# Patient Record
Sex: MALE | Race: WHITE | NOT HISPANIC OR LATINO | Employment: OTHER | URBAN - METROPOLITAN AREA
[De-identification: names, ages, dates, MRNs, and addresses within clinical notes are randomized per-mention and may not be internally consistent; named-entity substitution may affect disease eponyms.]

---

## 2017-01-20 ENCOUNTER — TELEPHONE (OUTPATIENT)
Dept: GASTROENTEROLOGY | Facility: CLINIC | Age: 82
End: 2017-01-20

## 2017-01-20 DIAGNOSIS — R19.7 DIARRHEA, UNSPECIFIED TYPE: Primary | ICD-10-CM

## 2017-01-20 NOTE — TELEPHONE ENCOUNTER
----- Message from Courtney Jones sent at 1/20/2017  2:33 PM CST -----  Contact: Patient  Kaila, patient [phone], Returning nurses Call. Call to POD, Please advise. Thanks.

## 2017-01-26 ENCOUNTER — LAB VISIT (OUTPATIENT)
Dept: LAB | Facility: HOSPITAL | Age: 82
End: 2017-01-26
Attending: INTERNAL MEDICINE
Payer: MEDICARE

## 2017-01-26 DIAGNOSIS — R19.7 DIARRHEA, UNSPECIFIED TYPE: ICD-10-CM

## 2017-01-26 LAB — WBC #/AREA STL HPF: NORMAL /[HPF]

## 2017-01-26 PROCEDURE — 87046 STOOL CULTR AEROBIC BACT EA: CPT

## 2017-01-26 PROCEDURE — 87045 FECES CULTURE AEROBIC BACT: CPT

## 2017-01-26 PROCEDURE — 87449 NOS EACH ORGANISM AG IA: CPT

## 2017-01-26 PROCEDURE — 87209 SMEAR COMPLEX STAIN: CPT

## 2017-01-26 PROCEDURE — 89055 LEUKOCYTE ASSESSMENT FECAL: CPT

## 2017-01-26 PROCEDURE — 87329 GIARDIA AG IA: CPT

## 2017-01-26 PROCEDURE — 87427 SHIGA-LIKE TOXIN AG IA: CPT

## 2017-01-27 LAB
C DIFF GDH STL QL: NEGATIVE
C DIFF TOX A+B STL QL IA: NEGATIVE
CRYPTOSP AG STL QL IA: NEGATIVE
E COLI SXT1 STL QL IA: NEGATIVE
E COLI SXT2 STL QL IA: NEGATIVE
G LAMBLIA AG STL QL IA: NEGATIVE
O+P STL TRI STN: NORMAL

## 2017-01-30 ENCOUNTER — TELEPHONE (OUTPATIENT)
Dept: GASTROENTEROLOGY | Facility: CLINIC | Age: 82
End: 2017-01-30

## 2017-01-30 LAB — BACTERIA STL CULT: NORMAL

## 2017-02-01 ENCOUNTER — TELEPHONE (OUTPATIENT)
Dept: GASTROENTEROLOGY | Facility: CLINIC | Age: 82
End: 2017-02-01

## 2017-02-01 NOTE — TELEPHONE ENCOUNTER
----- Message from Mary Murrell sent at 2/1/2017  3:04 PM CST -----  Contact: self   [phone]  Patient is calling for lab results. Thanks!

## 2017-02-01 NOTE — TELEPHONE ENCOUNTER
Patient notified and understands stool results. He states now having constipation only having bowel movement once a week instructed to try Miralax once daily

## 2017-02-07 DIAGNOSIS — I65.23 BILATERAL CAROTID ARTERY STENOSIS: Primary | ICD-10-CM

## 2017-02-07 DIAGNOSIS — I71.40 ABDOMINAL ANEURYSM WITHOUT MENTION OF RUPTURE: ICD-10-CM

## 2017-02-21 ENCOUNTER — HOSPITAL ENCOUNTER (OUTPATIENT)
Dept: RADIOLOGY | Facility: HOSPITAL | Age: 82
Discharge: HOME | End: 2017-02-21
Attending: THORACIC SURGERY (CARDIOTHORACIC VASCULAR SURGERY)
Payer: MEDICARE

## 2017-02-21 DIAGNOSIS — I65.23 BILATERAL CAROTID ARTERY STENOSIS: ICD-10-CM

## 2017-02-21 PROCEDURE — 93880 EXTRACRANIAL BILAT STUDY: CPT | Mod: 26,,, | Performed by: RADIOLOGY

## 2017-02-21 PROCEDURE — 93880 EXTRACRANIAL BILAT STUDY: CPT | Mod: TC

## 2017-07-18 DIAGNOSIS — I65.23 BILATERAL CAROTID ARTERY STENOSIS: Primary | ICD-10-CM

## 2017-08-01 ENCOUNTER — HOSPITAL ENCOUNTER (OUTPATIENT)
Dept: RADIOLOGY | Facility: HOSPITAL | Age: 82
Discharge: HOME | End: 2017-08-01
Attending: THORACIC SURGERY (CARDIOTHORACIC VASCULAR SURGERY)
Payer: MEDICARE

## 2017-08-01 DIAGNOSIS — I65.23 BILATERAL CAROTID ARTERY STENOSIS: ICD-10-CM

## 2017-08-01 PROCEDURE — 93880 EXTRACRANIAL BILAT STUDY: CPT | Mod: TC

## 2017-08-01 PROCEDURE — 93880 EXTRACRANIAL BILAT STUDY: CPT | Mod: 26,,, | Performed by: RADIOLOGY

## 2018-09-12 DIAGNOSIS — I65.23 BILATERAL CAROTID ARTERY STENOSIS: Primary | ICD-10-CM

## 2019-01-01 ENCOUNTER — TELEPHONE (OUTPATIENT)
Dept: FAMILY MEDICINE | Facility: CLINIC | Age: 84
End: 2019-01-01

## 2019-01-01 ENCOUNTER — OFFICE VISIT (OUTPATIENT)
Dept: FAMILY MEDICINE | Facility: CLINIC | Age: 84
End: 2019-01-01
Payer: MEDICARE

## 2019-01-01 VITALS
HEIGHT: 67 IN | WEIGHT: 190 LBS | OXYGEN SATURATION: 96 % | HEART RATE: 64 BPM | DIASTOLIC BLOOD PRESSURE: 75 MMHG | SYSTOLIC BLOOD PRESSURE: 110 MMHG | BODY MASS INDEX: 29.82 KG/M2

## 2019-01-01 DIAGNOSIS — F03.90 DEMENTIA WITHOUT BEHAVIORAL DISTURBANCE, UNSPECIFIED DEMENTIA TYPE: ICD-10-CM

## 2019-01-01 DIAGNOSIS — N32.81 OAB (OVERACTIVE BLADDER): ICD-10-CM

## 2019-01-01 DIAGNOSIS — H90.0 HEARING LOSS, CONDUCTIVE, BILATERAL: ICD-10-CM

## 2019-01-01 DIAGNOSIS — I10 HYPERTENSION, UNSPECIFIED TYPE: ICD-10-CM

## 2019-01-01 DIAGNOSIS — G47.00 INSOMNIA, UNSPECIFIED TYPE: ICD-10-CM

## 2019-01-01 DIAGNOSIS — F03.90 DEMENTIA WITHOUT BEHAVIORAL DISTURBANCE, UNSPECIFIED DEMENTIA TYPE: Primary | ICD-10-CM

## 2019-01-01 DIAGNOSIS — E78.5 HYPERLIPIDEMIA, UNSPECIFIED HYPERLIPIDEMIA TYPE: Chronic | ICD-10-CM

## 2019-01-01 DIAGNOSIS — F02.80 ALZHEIMER'S DEMENTIA WITHOUT BEHAVIORAL DISTURBANCE, UNSPECIFIED TIMING OF DEMENTIA ONSET: Primary | ICD-10-CM

## 2019-01-01 DIAGNOSIS — R35.0 URINARY FREQUENCY: ICD-10-CM

## 2019-01-01 DIAGNOSIS — I25.10 CORONARY ARTERY DISEASE INVOLVING NATIVE HEART WITHOUT ANGINA PECTORIS, UNSPECIFIED VESSEL OR LESION TYPE: ICD-10-CM

## 2019-01-01 DIAGNOSIS — I49.1 PAC (PREMATURE ATRIAL CONTRACTION): ICD-10-CM

## 2019-01-01 DIAGNOSIS — G30.9 ALZHEIMER'S DEMENTIA WITHOUT BEHAVIORAL DISTURBANCE, UNSPECIFIED TIMING OF DEMENTIA ONSET: Primary | ICD-10-CM

## 2019-01-01 DIAGNOSIS — Z12.5 SPECIAL SCREENING, PROSTATE CANCER: ICD-10-CM

## 2019-01-01 DIAGNOSIS — K50.10 CROHN'S DISEASE OF COLON WITHOUT COMPLICATION: ICD-10-CM

## 2019-01-01 DIAGNOSIS — R54 FRAILTY: ICD-10-CM

## 2019-01-01 DIAGNOSIS — R26.9 GAIT DISTURBANCE: ICD-10-CM

## 2019-01-01 PROCEDURE — 3074F SYST BP LT 130 MM HG: CPT | Mod: S$GLB,,, | Performed by: FAMILY MEDICINE

## 2019-01-01 PROCEDURE — 99214 PR OFFICE/OUTPT VISIT, EST, LEVL IV, 30-39 MIN: ICD-10-PCS | Mod: S$GLB,,, | Performed by: FAMILY MEDICINE

## 2019-01-01 PROCEDURE — 3078F PR MOST RECENT DIASTOLIC BLOOD PRESSURE < 80 MM HG: ICD-10-PCS | Mod: S$GLB,,, | Performed by: FAMILY MEDICINE

## 2019-01-01 PROCEDURE — 99214 OFFICE O/P EST MOD 30 MIN: CPT | Mod: S$GLB,,, | Performed by: FAMILY MEDICINE

## 2019-01-01 PROCEDURE — 1100F PR PT FALLS ASSESS DOC 2+ FALLS/FALL W/INJURY/YR: ICD-10-PCS | Mod: S$GLB,,, | Performed by: FAMILY MEDICINE

## 2019-01-01 PROCEDURE — 3074F PR MOST RECENT SYSTOLIC BLOOD PRESSURE < 130 MM HG: ICD-10-PCS | Mod: S$GLB,,, | Performed by: FAMILY MEDICINE

## 2019-01-01 PROCEDURE — 3078F DIAST BP <80 MM HG: CPT | Mod: S$GLB,,, | Performed by: FAMILY MEDICINE

## 2019-01-01 PROCEDURE — 3288F FALL RISK ASSESSMENT DOCD: CPT | Mod: S$GLB,,, | Performed by: FAMILY MEDICINE

## 2019-01-01 PROCEDURE — 1100F PTFALLS ASSESS-DOCD GE2>/YR: CPT | Mod: S$GLB,,, | Performed by: FAMILY MEDICINE

## 2019-01-01 PROCEDURE — 3288F PR FALLS RISK ASSESSMENT DOCUMENTED: ICD-10-PCS | Mod: S$GLB,,, | Performed by: FAMILY MEDICINE

## 2019-01-01 RX ORDER — MEMANTINE HYDROCHLORIDE 10 MG/1
10 TABLET ORAL 2 TIMES DAILY
Qty: 180 TABLET | Refills: 1 | Status: SHIPPED | OUTPATIENT
Start: 2019-01-01 | End: 2019-01-01 | Stop reason: SDUPTHER

## 2019-01-01 RX ORDER — SIMVASTATIN 40 MG/1
40 TABLET, FILM COATED ORAL DAILY
Qty: 90 TABLET | Refills: 1 | Status: SHIPPED | OUTPATIENT
Start: 2019-01-01 | End: 2019-01-01 | Stop reason: SDUPTHER

## 2019-01-01 RX ORDER — OXYBUTYNIN CHLORIDE 5 MG/1
5 TABLET ORAL DAILY
Qty: 90 TABLET | Refills: 1 | Status: SHIPPED | OUTPATIENT
Start: 2019-01-01 | End: 2020-01-01 | Stop reason: SDUPTHER

## 2019-01-01 RX ORDER — LISINOPRIL 5 MG/1
1 TABLET ORAL DAILY
COMMUNITY
Start: 2014-07-21 | End: 2019-01-01 | Stop reason: SDUPTHER

## 2019-01-01 RX ORDER — DM/PE/ACETAMINOPHEN/CHLORPHENR 10-5-325-2
1000 TABLET, SEQUENTIAL ORAL 2 TIMES DAILY
COMMUNITY

## 2019-01-01 RX ORDER — MEMANTINE HYDROCHLORIDE 10 MG/1
5 TABLET ORAL 2 TIMES DAILY
COMMUNITY
End: 2019-01-01 | Stop reason: SDUPTHER

## 2019-01-01 RX ORDER — LISINOPRIL 5 MG/1
5 TABLET ORAL DAILY
Qty: 90 TABLET | Refills: 1 | Status: SHIPPED | OUTPATIENT
Start: 2019-01-01 | End: 2020-01-01 | Stop reason: SDUPTHER

## 2019-01-01 RX ORDER — DONEPEZIL HYDROCHLORIDE 5 MG/1
5 TABLET, FILM COATED ORAL
COMMUNITY
Start: 2019-01-31 | End: 2019-01-01 | Stop reason: SDUPTHER

## 2019-01-01 RX ORDER — LISINOPRIL 5 MG/1
5 TABLET ORAL DAILY
Qty: 90 TABLET | Refills: 1 | Status: SHIPPED | OUTPATIENT
Start: 2019-01-01 | End: 2019-01-01 | Stop reason: SDUPTHER

## 2019-01-01 RX ORDER — OXYBUTYNIN CHLORIDE 5 MG/1
1 TABLET ORAL DAILY
COMMUNITY
End: 2019-01-01 | Stop reason: SDUPTHER

## 2019-01-01 RX ORDER — MEMANTINE HYDROCHLORIDE 10 MG/1
10 TABLET ORAL 2 TIMES DAILY
Qty: 180 TABLET | Refills: 1 | Status: SHIPPED | OUTPATIENT
Start: 2019-01-01 | End: 2020-01-01 | Stop reason: SDUPTHER

## 2019-01-01 RX ORDER — SIMVASTATIN 40 MG/1
1 TABLET, FILM COATED ORAL DAILY
COMMUNITY
End: 2019-01-01 | Stop reason: SDUPTHER

## 2019-01-01 RX ORDER — OMEGA-3-ACID ETHYL ESTERS 1 G/1
CAPSULE, LIQUID FILLED ORAL
COMMUNITY
End: 2019-01-01 | Stop reason: SDUPTHER

## 2019-01-01 RX ORDER — DONEPEZIL HYDROCHLORIDE 5 MG/1
5 TABLET, FILM COATED ORAL DAILY
Qty: 90 TABLET | Refills: 1 | Status: SHIPPED | OUTPATIENT
Start: 2019-01-01 | End: 2020-01-01 | Stop reason: SDUPTHER

## 2019-01-01 RX ORDER — SIMVASTATIN 40 MG/1
40 TABLET, FILM COATED ORAL DAILY
Qty: 90 TABLET | Refills: 1 | Status: SHIPPED | OUTPATIENT
Start: 2019-01-01 | End: 2020-01-01 | Stop reason: SDUPTHER

## 2019-01-01 RX ORDER — OMEGA-3-ACID ETHYL ESTERS 1 G/1
1000 CAPSULE, LIQUID FILLED ORAL 2 TIMES DAILY
Qty: 180 CAPSULE | Refills: 1 | Status: SHIPPED | OUTPATIENT
Start: 2019-01-01 | End: 2020-01-01

## 2019-09-06 PROBLEM — F02.80 ALZHEIMER'S DEMENTIA WITHOUT BEHAVIORAL DISTURBANCE: Status: ACTIVE | Noted: 2019-01-01

## 2019-09-06 PROBLEM — G47.00 INSOMNIA: Status: ACTIVE | Noted: 2019-01-01

## 2019-09-06 PROBLEM — N32.81 OAB (OVERACTIVE BLADDER): Status: ACTIVE | Noted: 2019-01-01

## 2019-09-06 PROBLEM — G30.9 ALZHEIMER'S DEMENTIA WITHOUT BEHAVIORAL DISTURBANCE: Status: ACTIVE | Noted: 2019-01-01

## 2019-09-06 PROBLEM — R26.9 GAIT DISTURBANCE: Status: ACTIVE | Noted: 2019-01-01

## 2019-09-06 NOTE — PATIENT INSTRUCTIONS
Aspirin, ASA chewable tablets  What is this medicine?  ASPIRIN (AS pir in) is a pain reliever. It is used to treat mild pain and fever. This medicine is also used as directed by a doctor to prevent and to treat heart attacks, to prevent strokes, and to treat arthritis or inflammation.  How should I use this medicine?  Take this medicine by mouth. Chew it completely before swallowing. Follow the directions on the package or prescription label. Do not take your medicine more often than directed.  Talk to your pediatrician regarding the use of this medicine in children. While this drug may be prescribed for children as young as 12 years of age for selected conditions, precautions do apply. Children and teenagers should not use this medicine to treat chicken pox or flu symptoms unless directed by a doctor.  Patients over 65 years old may have a stronger reaction and need a smaller dose.  What side effects may I notice from receiving this medicine?  Side effects that you should report to your doctor or health care professional as soon as possible:  · allergic reactions like skin rash, itching or hives, swelling of the face, lips, or tongue  · breathing problems  · changes in hearing, ringing in the ears  · confusion  · general ill feeling or flu-like symptoms  · pain on swallowing  · redness, blistering, peeling or loosening of the skin, including inside the mouth or nose  · signs and symptoms of bleeding such as bloody or black, tarry stools; red or dark-brown urine; spitting up blood or brown material that looks like coffee grounds; red spots on the skin; unusual bruising or bleeding from the eye, gums, or nose  · trouble passing urine or change in the amount of urine  · unusually weak or tired  · yellowing of the eyes or skin  Side effects that usually do not require medical attention (report to your doctor or health care professional if they continue or are bothersome):  · diarrhea or constipation  · nausea,  vomiting  · stomach gas, heartburn  What may interact with this medicine?  Do not take this medicine with any of the following medications:  · cidofovir  · ketorolac  · probenecid  This medicine may also interact with the following medications:  · alcohol  · alendronate  · bismuth subsalicylate  · flavocoxid  · herbal supplements like feverfew, garlic, rossana, ginkgo biloba, horse chestnut  · medicines for diabetes or glaucoma like acetazolamide, methazolamide  · medicines for gout  · medicines that treat or prevent blood clots like enoxaparin, heparin, ticlopidine, warfarin  · other aspirin and aspirin-like medicines  · NSAIDs, medicines for pain and inflammation, like ibuprofen or naproxen  · pemetrexed  · sulfinpyrazone  · varicella live vaccine  What if I miss a dose?  If you are taking this medicine on a regular schedule and miss a dose, take it as soon as you can. If it is almost time for your next dose, take only that dose. Do not take double or extra doses.  Where should I keep my medicine?  Keep out of the reach of children.  Store at room temperature between 15 and 30 degrees C (59 and 86 degrees F). Protect from heat and moisture. Do not use this medicine if it has a strong vinegar smell. Throw away any unused medicine after the expiration date.  What should I tell my health care provider before I take this medicine?  They need to know if you have any of these conditions:  · anemia  · asthma  · bleeding problems  · child with chickenpox, the flu, or other viral infection  · diabetes  · gout  · if you frequently drink alcohol containing drinks  · kidney disease  · liver disease  · low level of vitamin K  · lupus  · smoke tobacco  · stomach ulcers or other problems  · an unusual or allergic reaction to aspirin, tartrazine dye, other medicines, dyes, or preservatives  · pregnant or trying to get pregnant  · breast-feeding  What should I watch for while using this medicine?  If you are treating yourself for  pain, tell your doctor or health care professional if the pain lasts more than 10 days, if it gets worse, or if there is a new or different kind of pain. Tell your doctor if you see redness or swelling. Also, check with your doctor if you have a fever that lasts for more than 3 days. Only take this medicine to prevent heart attacks or blood clotting if prescribed by your doctor or health care professional.  Do not take aspirin or aspirin-like medicines with this medicine. Too much aspirin can be dangerous. Always read the labels carefully.  This medicine can irritate your stomach or cause bleeding problems. Do not smoke cigarettes or drink alcohol while taking this medicine. Do not lie down for 30 minutes after taking this medicine to prevent irritation to your throat.  If you are scheduled for any medical or dental procedure, tell your healthcare provider that you are taking this medicine. You may need to stop taking this medicine before the procedure.  NOTE:This sheet is a summary. It may not cover all possible information. If you have questions about this medicine, talk to your doctor, pharmacist, or health care provider. Copyright© 2017 [employer]

## 2019-09-06 NOTE — PROGRESS NOTES
Has had  No more  Hallucinations after  Stopping the donepezil..has close  Supervision of his family..Walks in the house independently,uses a rollator to walk outdoors to get mail.Showers  Qod ,    SUBJECTIVE:    Patient ID: Kaila Bear is a 83 y.o. male.    Chief Complaint: Annual Exam    HPI    No visits with results within 6 Month(s) from this visit.   Latest known visit with results is:   Lab Visit on 01/26/2017   Component Date Value Ref Range Status    C. diff Antigen 01/26/2017 Negative   Final    C difficile Toxins A+B, EIA 01/26/2017 Negative  Negative Final    Stool Culture 01/26/2017 No Salmonella,Shigella,Vibrio,Campylobacter,Yersinia isolated.   Final    Stool Exam-Ova,Cysts,Parasites 01/26/2017 No ova or parasites seen   Final    Stool WBC 01/26/2017 No neutrophils seen  No neutrophils seen Final    Giardia Antigen - EIA 01/26/2017 Negative  Negative Final    Cryptosporidium Antigen 01/26/2017 Negative  Negative Final    Shiga Toxin 1 E.coli 01/26/2017 Negative   Final    Shiga Toxin 2 E.coli 01/26/2017 Negative   Final       Past Medical History:   Diagnosis Date    Allergy     Anticoagulant long-term use     Arthritis     Cancer     prostate    Cataract     Coronary artery disease     Hepatitis     Hyperlipidemia     Hypertension      Past Surgical History:   Procedure Laterality Date    BREAST SURGERY  2000    partial masectomy left breast    COLONOSCOPY N/A 4/30/2014    [facility]    COLONOSCOPY W/ POLYPECTOMY  oct 2011    corotid artery      ESOPHAGOGASTRODUODENOSCOPY (EGD) N/A 8/17/2015    [facility]    EYE SURGERY      slaegle/ bilateral    PROSTATE SURGERY  2000    radical      Family History   Problem Relation Age of Onset    Alzheimer's disease Mother     Kidney disease Mother     Heart disease Father     Parkinsonism Brother        Marital Status:   Alcohol History:  reports that  he does not drink alcohol.  Tobacco History:  reports that he quit smoking about 38 years ago. He has never used smokeless tobacco.  Drug History:  reports that he does not use drugs.    Review of patient's allergies indicates:   Allergen Reactions    Bactrim [sulfamethoxazole-trimethoprim]     Morphine      Other reaction(s): Hallucinations    Sulfa (sulfonamide antibiotics)      Other reaction(s): anxient  Other reaction(s): anxient    Trazodone hcl        Current Outpatient Medications:     glucosamine sulfate 2KCl 1,000 mg Tab, Take by mouth., Disp: , Rfl:     lisinopril (PRINIVIL,ZESTRIL) 5 MG tablet, Take 1 tablet by mouth once daily., Disp: , Rfl:     memantine (NAMENDA) 10 MG Tab, Take 5 mg by mouth 2 (two) times daily., Disp: , Rfl:     oxybutynin (DITROPAN) 5 MG Tab, Take 1 tablet by mouth once daily., Disp: , Rfl:     simvastatin (ZOCOR) 40 MG tablet, Take 1 tablet by mouth once daily., Disp: , Rfl:     aspirin 81 mg Tab, Every day, Disp: , Rfl:     B2/vits A,C,E/lut/zeaxanth/min (ICAPS ORAL), Take by mouth., Disp: , Rfl:     cyanocobalamin, vitamin B-12, 5,000 mcg Subl, Place 5,000 mcg under the tongue once daily., Disp: 90 tablet, Rfl: 3    desoximetasone (TOPICORT) 0.05 % cream, , Disp: , Rfl:     donepezil (ARICEPT) 5 MG tablet, Take 5 mg by mouth., Disp: , Rfl:     melatonin 10 mg Tab, Take 10 mg by mouth every evening., Disp: 50 tablet, Rfl: 3    omega-3 acid ethyl esters (LOVAZA) 1 gram capsule, Take by mouth., Disp: , Rfl:     Review of Systems   Constitutional: Negative for activity change (awake at 7 am.naps in afternoon,watches TV,reads newspaper daily,does laundry & dishes), appetite change, chills, fatigue, fever and unexpected weight change.   HENT: Positive for hearing loss (no longer  wears  hearing aids, very [place]). Negative for congestion, ear pain and trouble swallowing.    Eyes: Negative for pain, discharge and visual disturbance.   Respiratory: Negative for apnea,  "cough, shortness of breath and wheezing.    Cardiovascular: Negative for chest pain and leg swelling.   Gastrointestinal: Negative for abdominal pain, blood in stool, constipation, diarrhea, nausea and vomiting.   Endocrine: Negative for cold intolerance, heat intolerance and polydipsia.   Genitourinary: Negative for dysuria, hematuria, testicular pain and urgency.   Musculoskeletal: Negative for gait problem, joint swelling and myalgias.   Neurological: Negative for dizziness, seizures and numbness.   Psychiatric/Behavioral: Negative for agitation, behavioral problems and hallucinations. The patient is not nervous/anxious.           Objective:      Vitals:    09/06/19 0759 09/06/19 0843   BP: (!) 158/66 110/75   Pulse: 64    SpO2:  96%   Weight: 86.2 kg (190 lb)    Height: 5' 6.5" (1.689 m)      Physical Exam   Constitutional: He is oriented to person, place, and time. He appears well-developed and well-nourished.   HENT:   Head: Normocephalic and atraumatic.   Right Ear: External ear normal.   Left Ear: External ear normal.   Nose: Nose normal.   Mouth/Throat: Oropharynx is clear and moist.   Very hard of hearing.  Does not wear hearing aids   Eyes: Pupils are equal, round, and reactive to light. EOM are normal.   Neck: Normal range of motion. Neck supple. Carotid bruit is not present. No thyromegaly present.   Cardiovascular: Normal rate, regular rhythm and intact distal pulses.   Murmur (Grade 2/6 systolic murmur) heard.  Pulmonary/Chest: Effort normal and breath sounds normal. He has no wheezes. He has no rales.   Abdominal: Soft. Bowel sounds are normal. He exhibits no distension. There is no hepatosplenomegaly. There is no tenderness.   Musculoskeletal: Normal range of motion. He exhibits no edema, tenderness or deformity.        Lumbar back: Normal. He exhibits no pain and no spasm.   Bends 90 degrees at  waist   Lymphadenopathy:     He has no cervical adenopathy.   Neurological: He is alert and oriented to " person, place, and time. No cranial nerve deficit. Coordination normal.   Gait -slow and cautious   Skin: Skin is warm and dry. No rash noted.   Psychiatric: He has a normal mood and affect. His behavior is normal.   He seems content.  Does not want to socialize much at the senior center.  No behavioral outbursts recently.   Nursing note and vitals reviewed.        Assessment:       1. Alzheimer's dementia without behavioral disturbance, unspecified timing of dementia onset    2. Crohn's disease of colon without complication    3. Insomnia, unspecified type    4. PAC (premature atrial contraction)    5. OAB (overactive bladder)    6. Dementia without behavioral disturbance, unspecified dementia type    7. Gait disturbance    8. Frailty    9. Coronary artery disease involving native heart without angina pectoris, unspecified vessel or lesion type    10. Hearing loss, conductive, bilateral    11. Hypertension, unspecified type         Plan:       Alzheimer's dementia without behavioral disturbance, unspecified timing of dementia onset    Doing very well with family's care.  No dangerous behaviors or activities noted  Crohn's disease of colon without complication     Asymptomatic at this time.  Insomnia, unspecified type     Sleep patterns have normalized  PAC (premature atrial contraction)    OAB (overactive bladder)     Managing well with oxybutynin  Dementia without behavioral disturbance, unspecified dementia type      Alzheimer's seems stable with current medications  Gait disturbance     No falls recently  Frailty    Coronary artery disease involving native heart without angina pectoris, unspecified vessel or lesion type  -     Lipid panel; Future; Expected date: 09/06/2019     I recommend restarting  aspirin 81 mg daily  Hearing loss, conductive, bilateral     He is not able to manage using hearing aids anymore.  Hypertension, unspecified type  -     Urinalysis, Reflex to Urine Culture Urine, Clean Catch  -      TSH w/reflex to FT4; Future; Expected date: 03/06/2020  -     CBC auto differential; Future; Expected date: 09/06/2019  -     Comprehensive metabolic panel; Future; Expected date: 09/06/2019    Hypertension seems to be well controlled with medications  Follow up in about 6 months (around 3/6/2020) for Annual Physical.

## 2019-09-16 NOTE — TELEPHONE ENCOUNTER
----- Message from Maryjane Munoz sent at 9/16/2019  3:18 PM CDT -----  Contact: Joseph rodriguez son  The patient was seen last week . [facility].Please call his son.  Joseph # [phone] GH

## 2019-10-08 NOTE — TELEPHONE ENCOUNTER
Spoke to [employer], son, that lab is due, fasting. This is from Overdue orders alert. Pushed lab date out.

## 2019-10-08 NOTE — TELEPHONE ENCOUNTER
In looking further into this, patient had labs drawn in May and is not due right now. He is due before March office visit. I spoke to Don that he is not due for labs now, but prior to March appointment.

## 2019-12-23 NOTE — TELEPHONE ENCOUNTER
----- Message from Maryjane Munoz sent at 12/23/2019  1:07 PM CST -----  Contact: LMOR   I called the patient back to see what refill he needed . I spoke to his son.His son said he could not get him here today.  Refill lisinopril 5 mg, oxybutynin 5 mg, simvastatin 40 mg, and memantine 10 mg. [facility]. Don pts son # [phone] GH

## 2020-01-01 ENCOUNTER — HOSPITAL ENCOUNTER (INPATIENT)
Facility: HOSPITAL | Age: 85
LOS: 2 days | DRG: 177 | End: 2020-04-26
Attending: EMERGENCY MEDICINE | Admitting: INTERNAL MEDICINE
Payer: MEDICARE

## 2020-01-01 ENCOUNTER — TELEPHONE (OUTPATIENT)
Dept: FAMILY MEDICINE | Facility: CLINIC | Age: 85
End: 2020-01-01

## 2020-01-01 ENCOUNTER — OFFICE VISIT (OUTPATIENT)
Dept: FAMILY MEDICINE | Facility: CLINIC | Age: 85
End: 2020-01-01
Payer: MEDICARE

## 2020-01-01 ENCOUNTER — EXTERNAL HOME HEALTH (OUTPATIENT)
Dept: HOME HEALTH SERVICES | Facility: HOSPITAL | Age: 85
End: 2020-01-01

## 2020-01-01 ENCOUNTER — PATIENT MESSAGE (OUTPATIENT)
Dept: FAMILY MEDICINE | Facility: CLINIC | Age: 85
End: 2020-01-01

## 2020-01-01 ENCOUNTER — DOCUMENT SCAN (OUTPATIENT)
Dept: HOME HEALTH SERVICES | Facility: HOSPITAL | Age: 85
End: 2020-01-01

## 2020-01-01 VITALS
HEIGHT: 67 IN | WEIGHT: 183 LBS | DIASTOLIC BLOOD PRESSURE: 70 MMHG | SYSTOLIC BLOOD PRESSURE: 124 MMHG | BODY MASS INDEX: 28.72 KG/M2 | HEART RATE: 76 BPM

## 2020-01-01 VITALS
DIASTOLIC BLOOD PRESSURE: 60 MMHG | HEART RATE: 81 BPM | SYSTOLIC BLOOD PRESSURE: 142 MMHG | HEIGHT: 69 IN | WEIGHT: 180 LBS | OXYGEN SATURATION: 95 % | BODY MASS INDEX: 26.66 KG/M2 | RESPIRATION RATE: 18 BRPM | TEMPERATURE: 98 F

## 2020-01-01 VITALS
WEIGHT: 182 LBS | DIASTOLIC BLOOD PRESSURE: 60 MMHG | HEIGHT: 67 IN | BODY MASS INDEX: 28.56 KG/M2 | SYSTOLIC BLOOD PRESSURE: 132 MMHG | SYSTOLIC BLOOD PRESSURE: 128 MMHG | HEART RATE: 60 BPM | WEIGHT: 188 LBS | HEIGHT: 67 IN | BODY MASS INDEX: 29.51 KG/M2 | DIASTOLIC BLOOD PRESSURE: 60 MMHG | HEART RATE: 72 BPM

## 2020-01-01 DIAGNOSIS — F02.80 ALZHEIMER'S DEMENTIA WITHOUT BEHAVIORAL DISTURBANCE, UNSPECIFIED TIMING OF DEMENTIA ONSET: ICD-10-CM

## 2020-01-01 DIAGNOSIS — C61 PROSTATE CANCER: ICD-10-CM

## 2020-01-01 DIAGNOSIS — L72.3 INFECTED SEBACEOUS CYST: Primary | ICD-10-CM

## 2020-01-01 DIAGNOSIS — R26.9 GAIT DISTURBANCE: ICD-10-CM

## 2020-01-01 DIAGNOSIS — R06.00 DYSPNEA: ICD-10-CM

## 2020-01-01 DIAGNOSIS — F02.80 LATE ONSET ALZHEIMER'S DISEASE WITHOUT BEHAVIORAL DISTURBANCE: ICD-10-CM

## 2020-01-01 DIAGNOSIS — N32.81 OAB (OVERACTIVE BLADDER): ICD-10-CM

## 2020-01-01 DIAGNOSIS — K50.10 CROHN'S DISEASE OF COLON WITHOUT COMPLICATION: ICD-10-CM

## 2020-01-01 DIAGNOSIS — R53.82 CHRONIC FATIGUE: ICD-10-CM

## 2020-01-01 DIAGNOSIS — G30.9 ALZHEIMER'S DEMENTIA WITHOUT BEHAVIORAL DISTURBANCE, UNSPECIFIED TIMING OF DEMENTIA ONSET: ICD-10-CM

## 2020-01-01 DIAGNOSIS — F32.9 REACTIVE DEPRESSION: ICD-10-CM

## 2020-01-01 DIAGNOSIS — I25.10 CORONARY ARTERY DISEASE INVOLVING NATIVE CORONARY ARTERY WITHOUT ANGINA PECTORIS, UNSPECIFIED WHETHER NATIVE OR TRANSPLANTED HEART: ICD-10-CM

## 2020-01-01 DIAGNOSIS — R35.0 URINARY FREQUENCY: ICD-10-CM

## 2020-01-01 DIAGNOSIS — I10 HYPERTENSION, UNSPECIFIED TYPE: ICD-10-CM

## 2020-01-01 DIAGNOSIS — E78.5 HYPERLIPIDEMIA, UNSPECIFIED HYPERLIPIDEMIA TYPE: Chronic | ICD-10-CM

## 2020-01-01 DIAGNOSIS — M15.9 PRIMARY OSTEOARTHRITIS INVOLVING MULTIPLE JOINTS: ICD-10-CM

## 2020-01-01 DIAGNOSIS — J12.82 PNEUMONIA DUE TO COVID-19 VIRUS: ICD-10-CM

## 2020-01-01 DIAGNOSIS — N19 UREMIA: ICD-10-CM

## 2020-01-01 DIAGNOSIS — E78.2 MIXED HYPERLIPIDEMIA: Chronic | ICD-10-CM

## 2020-01-01 DIAGNOSIS — J18.9 PNEUMONIA OF BOTH LUNGS DUE TO INFECTIOUS ORGANISM, UNSPECIFIED PART OF LUNG: Primary | ICD-10-CM

## 2020-01-01 DIAGNOSIS — L08.9 INFECTED SEBACEOUS CYST: Primary | ICD-10-CM

## 2020-01-01 DIAGNOSIS — K21.9 GASTROESOPHAGEAL REFLUX DISEASE WITHOUT ESOPHAGITIS: ICD-10-CM

## 2020-01-01 DIAGNOSIS — F02.80 LATE ONSET ALZHEIMER'S DISEASE WITHOUT BEHAVIORAL DISTURBANCE: Primary | ICD-10-CM

## 2020-01-01 DIAGNOSIS — G30.1 LATE ONSET ALZHEIMER'S DISEASE WITHOUT BEHAVIORAL DISTURBANCE: Primary | ICD-10-CM

## 2020-01-01 DIAGNOSIS — F03.90 DEMENTIA WITHOUT BEHAVIORAL DISTURBANCE, UNSPECIFIED DEMENTIA TYPE: ICD-10-CM

## 2020-01-01 DIAGNOSIS — Z98.890 S/P CAROTID ENDARTERECTOMY: ICD-10-CM

## 2020-01-01 DIAGNOSIS — R41.82 ALTERED MENTAL STATUS: ICD-10-CM

## 2020-01-01 DIAGNOSIS — G30.1 LATE ONSET ALZHEIMER'S DISEASE WITHOUT BEHAVIORAL DISTURBANCE: ICD-10-CM
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
## 2020-01-01 DIAGNOSIS — H91.93 HEARING DIFFICULTY OF BOTH EARS: ICD-10-CM

## 2020-01-01 DIAGNOSIS — U07.1 PNEUMONIA DUE TO COVID-19 VIRUS: ICD-10-CM

## 2020-01-01 DIAGNOSIS — R00.1 BRADYCARDIA: ICD-10-CM

## 2020-01-01 DIAGNOSIS — Z11.1 SCREENING-PULMONARY TB: ICD-10-CM

## 2020-01-01 DIAGNOSIS — N17.9 ACUTE KIDNEY INJURY: ICD-10-CM

## 2020-01-01 LAB
ALBUMIN SERPL BCP-MCNC: 3.4 G/DL (ref 3.5–5.2)
ALBUMIN SERPL-MCNC: 4.1 G/DL (ref 3.6–5.1)
ALBUMIN/GLOB SERPL: 1.9 (CALC) (ref 1–2.5)
ALLENS TEST: ABNORMAL
ALP SERPL-CCNC: 60 U/L (ref 35–144)
ALP SERPL-CCNC: 93 U/L (ref 55–135)
ALT SERPL W/O P-5'-P-CCNC: 18 U/L (ref 10–44)
ALT SERPL-CCNC: 9 U/L (ref 9–46)
ANION GAP SERPL CALC-SCNC: 14 MMOL/L (ref 8–16)
APTT PPP: 27.8 SEC (ref 23.6–33.3)
AST SERPL-CCNC: 13 U/L (ref 10–35)
AST SERPL-CCNC: 36 U/L (ref 10–40)
BASOPHILS # BLD AUTO: 0.03 K/UL (ref 0–0.2)
BASOPHILS # BLD AUTO: 39 CELLS/UL (ref 0–200)
BASOPHILS NFR BLD AUTO: 0.5 %
BASOPHILS NFR BLD: 0.2 % (ref 0–1.9)
BILIRUB SERPL-MCNC: 0.6 MG/DL (ref 0.2–1.2)
BILIRUB SERPL-MCNC: 1 MG/DL (ref 0.1–1)
BNP SERPL-MCNC: 1203 PG/ML (ref 0–99)
BUN SERPL-MCNC: 146 MG/DL (ref 8–23)
BUN SERPL-MCNC: 17 MG/DL (ref 7–25)
BUN/CREAT SERPL: 14 (CALC) (ref 6–22)
CALCIUM SERPL-MCNC: 9.3 MG/DL (ref 8.7–10.5)
CALCIUM SERPL-MCNC: 9.4 MG/DL (ref 8.6–10.3)
CHLORIDE SERPL-SCNC: 104 MMOL/L (ref 98–110)
CHLORIDE SERPL-SCNC: 118 MMOL/L (ref 95–110)
CHOLEST SERPL-MCNC: 150 MG/DL
CHOLEST/HDLC SERPL: 4.1 (CALC)
CK MB SERPL-MCNC: 6.3 NG/ML (ref 0.1–6.5)
CK SERPL-CCNC: 431 U/L (ref 20–200)
CO2 SERPL-SCNC: 22 MMOL/L (ref 23–29)
CO2 SERPL-SCNC: 27 MMOL/L (ref 20–32)
CREAT SERPL-MCNC: 1.2 MG/DL (ref 0.7–1.11)
CREAT SERPL-MCNC: 4.3 MG/DL (ref 0.5–1.4)
CRP SERPL-MCNC: 13.03 MG/DL (ref 0–0.75)
DELSYS: ABNORMAL
DIFFERENTIAL METHOD: ABNORMAL
EOSINOPHIL # BLD AUTO: 0.1 K/UL (ref 0–0.5)
EOSINOPHIL # BLD AUTO: 123 CELLS/UL (ref 15–500)
EOSINOPHIL NFR BLD AUTO: 1.6 %
EOSINOPHIL NFR BLD: 1 % (ref 0–8)
EP: 5
ERYTHROCYTE [DISTWIDTH] IN BLOOD BY AUTOMATED COUNT: 13.2 % (ref 11–15)
ERYTHROCYTE [DISTWIDTH] IN BLOOD BY AUTOMATED COUNT: 14.6 % (ref 11.5–14.5)
ERYTHROCYTE [SEDIMENTATION RATE] IN BLOOD BY WESTERGREN METHOD: 12 MM/H
ERYTHROCYTE [SEDIMENTATION RATE] IN BLOOD BY WESTERGREN METHOD: 15 MM/HR (ref 0–10)
EST. GFR  (AFRICAN AMERICAN): 13.7 ML/MIN/1.73 M^2
EST. GFR  (NON AFRICAN AMERICAN): 11.9 ML/MIN/1.73 M^2
FERRITIN SERPL-MCNC: 1785 NG/ML (ref 20–300)
FIO2: 50
GFRSERPLBLD MDRD-ARVRAT: 56 ML/MIN/1.73M2
GLOBULIN SER CALC-MCNC: 2.2 G/DL (CALC) (ref 1.9–3.7)
GLUCOSE SERPL-MCNC: 106 MG/DL (ref 70–110)
GLUCOSE SERPL-MCNC: 134 MG/DL (ref 70–110)
GLUCOSE SERPL-MCNC: 96 MG/DL (ref 65–139)
HCO3 UR-SCNC: 25.6 MMOL/L (ref 24–28)
HCT VFR BLD AUTO: 42.7 % (ref 38.5–50)
HCT VFR BLD AUTO: 52.1 % (ref 40–54)
HDLC SERPL-MCNC: 37 MG/DL
HGB BLD-MCNC: 14.4 G/DL (ref 13.2–17.1)
HGB BLD-MCNC: 16.1 G/DL (ref 14–18)
IMM GRANULOCYTES # BLD AUTO: 0.07 K/UL (ref 0–0.04)
IMM GRANULOCYTES NFR BLD AUTO: 0.5 % (ref 0–0.5)
INFLUENZA A, MOLECULAR: NEGATIVE
INFLUENZA B, MOLECULAR: NEGATIVE
INR PPP: 1.5
IP: 10
LACTATE SERPL-SCNC: 2.5 MMOL/L (ref 0.5–1.9)
LDLC SERPL CALC-MCNC: 92 MG/DL (CALC)
LYMPHOCYTES # BLD AUTO: 2 K/UL (ref 1–4.8)
LYMPHOCYTES # BLD AUTO: 2456 CELLS/UL (ref 850–3900)
LYMPHOCYTES NFR BLD AUTO: 31.9 %
LYMPHOCYTES NFR BLD: 14.2 % (ref 18–48)
MAGNESIUM SERPL-MCNC: 3.3 MG/DL (ref 1.6–2.6)
MCH RBC QN AUTO: 27.2 PG (ref 27–31)
MCH RBC QN AUTO: 29.6 PG (ref 27–33)
MCHC RBC AUTO-ENTMCNC: 30.9 G/DL (ref 32–36)
MCHC RBC AUTO-ENTMCNC: 33.7 G/DL (ref 32–36)
MCV RBC AUTO: 87.7 FL (ref 80–100)
MCV RBC AUTO: 88 FL (ref 82–98)
MIN VOL: 18.4
MODE: ABNORMAL
MONOCYTES # BLD AUTO: 0.9 K/UL (ref 0.3–1)
MONOCYTES # BLD AUTO: 570 CELLS/UL (ref 200–950)
MONOCYTES NFR BLD AUTO: 7.4 %
MONOCYTES NFR BLD: 6.4 % (ref 4–15)
NEUTROPHILS # BLD AUTO: 10.9 K/UL (ref 1.8–7.7)
NEUTROPHILS # BLD AUTO: 4512 CELLS/UL (ref 1500–7800)
NEUTROPHILS NFR BLD AUTO: 58.6 %
NEUTROPHILS NFR BLD: 77.7 % (ref 38–73)
NONHDLC SERPL-MCNC: 113 MG/DL (CALC)
NRBC BLD-RTO: 0 /100 WBC
PCO2 BLDA: 43.2 MMHG (ref 35–45)
PH SMN: 7.38 [PH] (ref 7.35–7.45)
PHOSPHATE SERPL-MCNC: 5.7 MG/DL (ref 2.7–4.5)
PLATELET # BLD AUTO: 162 THOUSAND/UL (ref 140–400)
PLATELET # BLD AUTO: 170 K/UL (ref 150–350)
PMV BLD AUTO: 12 FL (ref 9.2–12.9)
PMV BLD REES-ECKER: 10.9 FL (ref 7.5–12.5)
PO2 BLDA: 20 MMHG (ref 40–60)
POC BE: 0 MMOL/L
POC SATURATED O2: 30 % (ref 95–100)
POC TCO2: 27 MMOL/L (ref 24–29)
POTASSIUM SERPL-SCNC: 4.2 MMOL/L (ref 3.5–5.3)
POTASSIUM SERPL-SCNC: 5.2 MMOL/L (ref 3.5–5.1)
PROCALCITONIN SERPL IA-MCNC: 0.28 NG/ML (ref 0–0.5)
PROT SERPL-MCNC: 6.3 G/DL (ref 6.1–8.1)
PROT SERPL-MCNC: 7.7 G/DL (ref 6–8.4)
PROTHROMBIN TIME: 17.1 SEC (ref 10.6–14.8)
PSA SERPL-MCNC: <0.1 NG/ML
RBC # BLD AUTO: 4.87 MILLION/UL (ref 4.2–5.8)
RBC # BLD AUTO: 5.91 M/UL (ref 4.6–6.2)
SAMPLE: ABNORMAL
SARS-COV-2 RDRP RESP QL NAA+PROBE: POSITIVE
SITE: ABNORMAL
SODIUM SERPL-SCNC: 140 MMOL/L (ref 135–146)
SODIUM SERPL-SCNC: 154 MMOL/L (ref 136–145)
SP02: 99
SPECIMEN SOURCE: NORMAL
SPONT RATE: 22
TRIGL SERPL-MCNC: 113 MG/DL
TROPONIN I SERPL DL<=0.01 NG/ML-MCNC: 0.06 NG/ML
TSH SERPL-ACNC: 3.3 MIU/L (ref 0.4–4.5)
WBC # BLD AUTO: 14.05 K/UL (ref 3.9–12.7)
WBC # BLD AUTO: 7.7 THOUSAND/UL (ref 3.8–10.8)

## 2020-01-01 PROCEDURE — 99213 OFFICE O/P EST LOW 20 MIN: CPT | Mod: S$GLB,,, | Performed by: NURSE PRACTITIONER

## 2020-01-01 PROCEDURE — 82955 ASSAY OF G6PD ENZYME: CPT

## 2020-01-01 PROCEDURE — 3074F PR MOST RECENT SYSTOLIC BLOOD PRESSURE < 130 MM HG: ICD-10-PCS | Mod: S$GLB,,, | Performed by: NURSE PRACTITIONER

## 2020-01-01 PROCEDURE — 63600175 PHARM REV CODE 636 W HCPCS: Performed by: EMERGENCY MEDICINE

## 2020-01-01 PROCEDURE — 3078F DIAST BP <80 MM HG: CPT | Mod: S$GLB,,, | Performed by: NURSE PRACTITIONER

## 2020-01-01 PROCEDURE — 3078F PR MOST RECENT DIASTOLIC BLOOD PRESSURE < 80 MM HG: ICD-10-PCS | Mod: S$GLB,,, | Performed by: FAMILY MEDICINE

## 2020-01-01 PROCEDURE — 1100F PR PT FALLS ASSESS DOC 2+ FALLS/FALL W/INJURY/YR: ICD-10-PCS | Mod: S$GLB,,, | Performed by: NURSE PRACTITIONER

## 2020-01-01 PROCEDURE — 85610 PROTHROMBIN TIME: CPT

## 2020-01-01 PROCEDURE — 99214 PR OFFICE/OUTPT VISIT, EST, LEVL IV, 30-39 MIN: ICD-10-PCS | Mod: S$GLB,,, | Performed by: FAMILY MEDICINE

## 2020-01-01 PROCEDURE — 99291 CRITICAL CARE FIRST HOUR: CPT

## 2020-01-01 PROCEDURE — 1100F PTFALLS ASSESS-DOCD GE2>/YR: CPT | Mod: S$GLB,,, | Performed by: FAMILY MEDICINE

## 2020-01-01 PROCEDURE — 12000002 HC ACUTE/MED SURGE SEMI-PRIVATE ROOM

## 2020-01-01 PROCEDURE — 86580 PR  TB INTRADERMAL TEST: ICD-10-PCS | Mod: S$GLB,,, | Performed by: NURSE PRACTITIONER

## 2020-01-01 PROCEDURE — 96365 THER/PROPH/DIAG IV INF INIT: CPT

## 2020-01-01 PROCEDURE — 82728 ASSAY OF FERRITIN: CPT

## 2020-01-01 PROCEDURE — 3078F PR MOST RECENT DIASTOLIC BLOOD PRESSURE < 80 MM HG: ICD-10-PCS | Mod: S$GLB,,, | Performed by: NURSE PRACTITIONER

## 2020-01-01 PROCEDURE — 3288F PR FALLS RISK ASSESSMENT DOCUMENTED: ICD-10-PCS | Mod: S$GLB,,, | Performed by: FAMILY MEDICINE

## 2020-01-01 PROCEDURE — 27000221 HC OXYGEN, UP TO 24 HOURS

## 2020-01-01 PROCEDURE — 83735 ASSAY OF MAGNESIUM: CPT

## 2020-01-01 PROCEDURE — 1159F PR MEDICATION LIST DOCUMENTED IN MEDICAL RECORD: ICD-10-PCS | Mod: S$GLB,,, | Performed by: NURSE PRACTITIONER

## 2020-01-01 PROCEDURE — U0002 COVID-19 LAB TEST NON-CDC: HCPCS

## 2020-01-01 PROCEDURE — 84100 ASSAY OF PHOSPHORUS: CPT

## 2020-01-01 PROCEDURE — 1159F MED LIST DOCD IN RCRD: CPT | Mod: S$GLB,,, | Performed by: NURSE PRACTITIONER

## 2020-01-01 PROCEDURE — 3288F PR FALLS RISK ASSESSMENT DOCUMENTED: ICD-10-PCS | Mod: S$GLB,,, | Performed by: NURSE PRACTITIONER

## 2020-01-01 PROCEDURE — 1159F MED LIST DOCD IN RCRD: CPT | Mod: S$GLB,,, | Performed by: FAMILY MEDICINE

## 2020-01-01 PROCEDURE — 3074F SYST BP LT 130 MM HG: CPT | Mod: S$GLB,,, | Performed by: NURSE PRACTITIONER

## 2020-01-01 PROCEDURE — 94660 CPAP INITIATION&MGMT: CPT

## 2020-01-01 PROCEDURE — 99900035 HC TECH TIME PER 15 MIN (STAT)

## 2020-01-01 PROCEDURE — 94761 N-INVAS EAR/PLS OXIMETRY MLT: CPT

## 2020-01-01 PROCEDURE — 3288F FALL RISK ASSESSMENT DOCD: CPT | Mod: S$GLB,,, | Performed by: NURSE PRACTITIONER

## 2020-01-01 PROCEDURE — 3074F SYST BP LT 130 MM HG: CPT | Mod: S$GLB,,, | Performed by: FAMILY MEDICINE

## 2020-01-01 PROCEDURE — 1159F PR MEDICATION LIST DOCUMENTED IN MEDICAL RECORD: ICD-10-PCS | Mod: S$GLB,,, | Performed by: FAMILY MEDICINE

## 2020-01-01 PROCEDURE — 87502 INFLUENZA DNA AMP PROBE: CPT

## 2020-01-01 PROCEDURE — 84484 ASSAY OF TROPONIN QUANT: CPT

## 2020-01-01 PROCEDURE — 36415 COLL VENOUS BLD VENIPUNCTURE: CPT

## 2020-01-01 PROCEDURE — 80053 COMPREHEN METABOLIC PANEL: CPT

## 2020-01-01 PROCEDURE — 3075F PR MOST RECENT SYSTOLIC BLOOD PRESS GE 130-139MM HG: ICD-10-PCS | Mod: S$GLB,,, | Performed by: NURSE PRACTITIONER

## 2020-01-01 PROCEDURE — 96366 THER/PROPH/DIAG IV INF ADDON: CPT

## 2020-01-01 PROCEDURE — 3074F PR MOST RECENT SYSTOLIC BLOOD PRESSURE < 130 MM HG: ICD-10-PCS | Mod: S$GLB,,, | Performed by: FAMILY MEDICINE

## 2020-01-01 PROCEDURE — 93005 ELECTROCARDIOGRAM TRACING: CPT | Performed by: INTERNAL MEDICINE

## 2020-01-01 PROCEDURE — 3288F FALL RISK ASSESSMENT DOCD: CPT | Mod: S$GLB,,, | Performed by: FAMILY MEDICINE

## 2020-01-01 PROCEDURE — 85651 RBC SED RATE NONAUTOMATED: CPT

## 2020-01-01 PROCEDURE — 99213 PR OFFICE/OUTPT VISIT, EST, LEVL III, 20-29 MIN: ICD-10-PCS | Mod: S$GLB,,, | Performed by: NURSE PRACTITIONER

## 2020-01-01 PROCEDURE — 1100F PTFALLS ASSESS-DOCD GE2>/YR: CPT | Mod: S$GLB,,, | Performed by: NURSE PRACTITIONER

## 2020-01-01 PROCEDURE — 85730 THROMBOPLASTIN TIME PARTIAL: CPT

## 2020-01-01 PROCEDURE — 99214 OFFICE O/P EST MOD 30 MIN: CPT | Mod: S$GLB,,, | Performed by: FAMILY MEDICINE

## 2020-01-01 PROCEDURE — 82962 GLUCOSE BLOOD TEST: CPT

## 2020-01-01 PROCEDURE — 25000003 PHARM REV CODE 250: Performed by: EMERGENCY MEDICINE

## 2020-01-01 PROCEDURE — 85025 COMPLETE CBC W/AUTO DIFF WBC: CPT

## 2020-01-01 PROCEDURE — 82553 CREATINE MB FRACTION: CPT

## 2020-01-01 PROCEDURE — 83880 ASSAY OF NATRIURETIC PEPTIDE: CPT

## 2020-01-01 PROCEDURE — 84145 PROCALCITONIN (PCT): CPT

## 2020-01-01 PROCEDURE — 83605 ASSAY OF LACTIC ACID: CPT

## 2020-01-01 PROCEDURE — 63600175 PHARM REV CODE 636 W HCPCS: Performed by: INTERNAL MEDICINE

## 2020-01-01 PROCEDURE — 3075F SYST BP GE 130 - 139MM HG: CPT | Mod: S$GLB,,, | Performed by: NURSE PRACTITIONER

## 2020-01-01 PROCEDURE — 87040 BLOOD CULTURE FOR BACTERIA: CPT | Mod: 59

## 2020-01-01 PROCEDURE — 86140 C-REACTIVE PROTEIN: CPT

## 2020-01-01 PROCEDURE — 86580 TB INTRADERMAL TEST: CPT | Mod: S$GLB,,, | Performed by: NURSE PRACTITIONER

## 2020-01-01 PROCEDURE — 82550 ASSAY OF CK (CPK): CPT

## 2020-01-01 PROCEDURE — 3078F DIAST BP <80 MM HG: CPT | Mod: S$GLB,,, | Performed by: FAMILY MEDICINE

## 2020-01-01 PROCEDURE — 1100F PR PT FALLS ASSESS DOC 2+ FALLS/FALL W/INJURY/YR: ICD-10-PCS | Mod: S$GLB,,, | Performed by: FAMILY MEDICINE

## 2020-01-01 RX ORDER — DONEPEZIL HYDROCHLORIDE 5 MG/1
5 TABLET, FILM COATED ORAL DAILY
Qty: 90 TABLET | Refills: 1 | Status: SHIPPED | OUTPATIENT
Start: 2020-01-01

## 2020-01-01 RX ORDER — CITALOPRAM 10 MG/1
10 TABLET ORAL DAILY
Qty: 30 TABLET | Refills: 11 | Status: SHIPPED | OUTPATIENT
Start: 2020-01-01 | End: 2021-03-12

## 2020-01-01 RX ORDER — LEVOFLOXACIN 5 MG/ML
750 INJECTION, SOLUTION INTRAVENOUS
Status: COMPLETED | OUTPATIENT
Start: 2020-01-01 | End: 2020-01-01

## 2020-01-01 RX ORDER — LISINOPRIL 5 MG/1
5 TABLET ORAL DAILY
Qty: 90 TABLET | Refills: 1 | Status: SHIPPED | OUTPATIENT
Start: 2020-01-01

## 2020-01-01 RX ORDER — MEMANTINE HYDROCHLORIDE 10 MG/1
10 TABLET ORAL 2 TIMES DAILY
Qty: 180 TABLET | Refills: 1 | Status: SHIPPED | OUTPATIENT
Start: 2020-01-01

## 2020-01-01 RX ORDER — ACETAMINOPHEN 500 MG
1000 TABLET ORAL EVERY 6 HOURS PRN
COMMUNITY

## 2020-01-01 RX ORDER — DOXYCYCLINE 100 MG/1
100 CAPSULE ORAL EVERY 12 HOURS
Qty: 14 CAPSULE | Refills: 0 | Status: SHIPPED | OUTPATIENT
Start: 2020-01-01

## 2020-01-01 RX ORDER — OXYBUTYNIN CHLORIDE 5 MG/1
5 TABLET ORAL DAILY
Qty: 90 TABLET | Refills: 1 | Status: SHIPPED | OUTPATIENT
Start: 2020-01-01 | End: 2020-01-01 | Stop reason: SDUPTHER

## 2020-01-01 RX ORDER — MUPIROCIN 20 MG/G
OINTMENT TOPICAL DAILY
Qty: 22 G | Refills: 0 | Status: SHIPPED | OUTPATIENT
Start: 2020-01-01

## 2020-01-01 RX ORDER — SODIUM CHLORIDE 0.9 % (FLUSH) 0.9 %
10 SYRINGE (ML) INJECTION
Status: DISCONTINUED | OUTPATIENT
Start: 2020-01-01 | End: 2020-01-01 | Stop reason: HOSPADM

## 2020-01-01 RX ORDER — LORAZEPAM 2 MG/ML
1 INJECTION INTRAMUSCULAR
Status: DISCONTINUED | OUTPATIENT
Start: 2020-01-01 | End: 2020-01-01 | Stop reason: HOSPADM

## 2020-01-01 RX ORDER — LORAZEPAM 2 MG/ML
1 INJECTION INTRAMUSCULAR EVERY 30 MIN PRN
Status: DISCONTINUED | OUTPATIENT
Start: 2020-01-01 | End: 2020-01-01 | Stop reason: SDUPTHER

## 2020-01-01 RX ORDER — ALPRAZOLAM 0.5 MG/1
0.5 TABLET ORAL 2 TIMES DAILY PRN
Qty: 60 TABLET | Refills: 0 | Status: SHIPPED | OUTPATIENT
Start: 2020-01-01 | End: 2020-01-01

## 2020-01-01 RX ORDER — CITALOPRAM 10 MG/1
10 TABLET ORAL DAILY
Qty: 30 TABLET | Refills: 11 | Status: SHIPPED | OUTPATIENT
Start: 2020-01-01 | End: 2020-01-01 | Stop reason: SDUPTHER

## 2020-01-01 RX ORDER — SIMVASTATIN 40 MG/1
40 TABLET, FILM COATED ORAL DAILY
Qty: 90 TABLET | Refills: 1 | Status: SHIPPED | OUTPATIENT
Start: 2020-01-01

## 2020-01-01 RX ORDER — VANCOMYCIN HCL IN 5 % DEXTROSE 1G/250ML
1000 PLASTIC BAG, INJECTION (ML) INTRAVENOUS ONCE
Status: DISCONTINUED | OUTPATIENT
Start: 2020-01-01 | End: 2020-01-01

## 2020-01-01 RX ORDER — PETROLATUM,WHITE/LANOLIN
1 OINTMENT (GRAM) TOPICAL 2 TIMES DAILY
COMMUNITY
Start: 2020-01-01

## 2020-01-01 RX ORDER — HYDROMORPHONE HYDROCHLORIDE 1 MG/ML
0.5 INJECTION, SOLUTION INTRAMUSCULAR; INTRAVENOUS; SUBCUTANEOUS
Status: DISCONTINUED | OUTPATIENT
Start: 2020-01-01 | End: 2020-01-01 | Stop reason: HOSPADM

## 2020-01-01 RX ORDER — MEGESTROL ACETATE 40 MG/1
40 TABLET ORAL 2 TIMES DAILY
COMMUNITY

## 2020-01-01 RX ORDER — ALPRAZOLAM 0.25 MG/1
0.5 TABLET ORAL 2 TIMES DAILY PRN
Qty: 30 TABLET | Refills: 2 | Status: SHIPPED | OUTPATIENT
Start: 2020-01-01 | End: 2020-01-01

## 2020-01-01 RX ORDER — OXYBUTYNIN CHLORIDE 5 MG/1
5 TABLET ORAL DAILY
Qty: 90 TABLET | Refills: 1 | Status: SHIPPED | OUTPATIENT
Start: 2020-01-01

## 2020-01-01 RX ORDER — SODIUM CHLORIDE 9 MG/ML
500 INJECTION, SOLUTION INTRAVENOUS
Status: COMPLETED | OUTPATIENT
Start: 2020-01-01 | End: 2020-01-01

## 2020-01-01 RX ORDER — PETROLATUM,WHITE/LANOLIN
1 OINTMENT (GRAM) TOPICAL 2 TIMES DAILY
COMMUNITY
End: 2020-01-01 | Stop reason: SDUPTHER

## 2020-01-01 RX ORDER — ONDANSETRON 2 MG/ML
4 INJECTION INTRAMUSCULAR; INTRAVENOUS EVERY 6 HOURS PRN
Status: DISCONTINUED | OUTPATIENT
Start: 2020-01-01 | End: 2020-01-01 | Stop reason: HOSPADM

## 2020-01-01 RX ADMIN — SODIUM CHLORIDE 500 ML: 0.9 INJECTION, SOLUTION INTRAVENOUS at 01:04

## 2020-01-01 RX ADMIN — HYDROMORPHONE HYDROCHLORIDE 0.5 MG: 1 INJECTION, SOLUTION INTRAMUSCULAR; INTRAVENOUS; SUBCUTANEOUS at 03:04

## 2020-01-01 RX ADMIN — LEVOFLOXACIN 750 MG: 5 INJECTION, SOLUTION INTRAVENOUS at 12:04

## 2020-01-01 RX ADMIN — ONDANSETRON 4 MG: 2 INJECTION INTRAMUSCULAR; INTRAVENOUS at 03:04

## 2020-01-01 RX ADMIN — LORAZEPAM 1 MG: 2 INJECTION INTRAMUSCULAR; INTRAVENOUS at 06:04

## 2020-01-03 NOTE — TELEPHONE ENCOUNTER
Spoke with pt caretaker informed him that a 6mth supply was sent in for him and he should have one refill on file with the pharmacy, I asked him to call the pharmacy and check with them on the refill and if they do not have it to give us a call back. Caretaker stated he will.

## 2020-01-03 NOTE — TELEPHONE ENCOUNTER
----- Message from Christal Grove sent at 1/3/2020 12:07 PM CST -----  Contact: pts son  VM    Pt called a couple weeks ago, and  1 of the 4 wasn't sent in and completely out    Oxybutynin    ( no call back # given)

## 2020-01-21 NOTE — TELEPHONE ENCOUNTER
----- Message from Richa Reese sent at 1/21/2020 11:16 AM CST -----  Contact: Walk In  @ 11:16 patients son Don came in stated that we called him early December to r/s his dad for the 3/6/2020 appt.  I was going to r/s myself but it puts him into May.  Can you please call him back.  Also the son is needing a letter from his PCP stating due to patients Dementia he is unable to make sound decisions.  The son has POA but the insurance and estate will not accept that alone.   # [phone]

## 2020-02-03 NOTE — PROCEDURES
Incision & Drainage  Date/Time: 2/3/2020 3:40 PM  Performed by: Maryellen Sewell NP  Authorized by: Maryellen Sewell NP     Consent Done?:  Yes (Verbal)    Type:  Cyst  Body area:  Trunk  Anesthesia:  Local infiltration  Local anesthetic: lidocaine 1% with epinephrine  Scalpel size:  11  Incision type:  Single straight  Complexity:  Simple  Drainage:  Pus  Drainage amount:  Moderate  Wound treatment:  Deloculation and expression of material  Packing material:  Wick placed  Patient tolerance:  Patient tolerated the procedure well with no immediate complications

## 2020-02-03 NOTE — PROGRESS NOTES
SUBJECTIVE:    Patient ID: Kaila Bear is a 84 y.o. male.    Chief Complaint: Wound Infection (on lower back over spine since yesterday, no bottles// SW)    Pt here for sick visit- pt reported to son yesterday he had a sore on his back.  Son noticed reddened swollen area with black and in the center and brought him in for drainage. No fever or chills reported.  Son reports patient has dementia, lives alone though family checks on him twice a day.  They are actually considering moving him into [place] and asking for paperwork to be started.      No visits with results within 6 Month(s) from this visit.   Latest known visit with results is:   Lab Visit on 01/26/2017   Component Date Value Ref Range Status    C. diff Antigen 01/26/2017 Negative   Final    C difficile Toxins A+B, EIA 01/26/2017 Negative  Negative Final    Stool Culture 01/26/2017 No Salmonella,Shigella,Vibrio,Campylobacter,Yersinia isolated.   Final    Stool Exam-Ova,Cysts,Parasites 01/26/2017 No ova or parasites seen   Final    Stool WBC 01/26/2017 No neutrophils seen  No neutrophils seen Final    Giardia Antigen - EIA 01/26/2017 Negative  Negative Final    Cryptosporidium Antigen 01/26/2017 Negative  Negative Final    Shiga Toxin 1 E.coli 01/26/2017 Negative   Final    Shiga Toxin 2 E.coli 01/26/2017 Negative   Final       Past Medical History:   Diagnosis Date    Allergy     Anticoagulant long-term use     Arthritis     Cancer     prostate    Cataract     Coronary artery disease     Hepatitis     Hyperlipidemia     Hypertension      Past Surgical History:   Procedure Laterality Date    BREAST SURGERY  2000    partial masectomy left breast    COLONOSCOPY W/ POLYPECTOMY  oct 2011    corotid artery      EYE SURGERY      slaegle/ bilateral    PROSTATE SURGERY  2000    radical      Family History   Problem Relation Age of Onset    Alzheimer's disease Mother     Kidney disease Mother     Heart disease Father      Parkinsonism Brother        Marital Status:   Alcohol History:  reports that he does not drink alcohol.  Tobacco History:  reports that he quit smoking about 39 years ago. He has never used smokeless tobacco.  Drug History:  reports that he does not use drugs.    Review of patient's allergies indicates:   Allergen Reactions    Bactrim [sulfamethoxazole-trimethoprim]     Morphine      Other reaction(s): Hallucinations    Sulfa (sulfonamide antibiotics)      Other reaction(s): anxient  Other reaction(s): anxient    Trazodone hcl        Current Outpatient Medications:     aspirin 81 mg Tab, Every day, Disp: , Rfl:     B2/vits A,C,E/lut/zeaxanth/min (ICAPS ORAL), Take by mouth., Disp: , Rfl:     cyanocobalamin, vitamin B-12, 5,000 mcg Subl, Place 5,000 mcg under the tongue once daily., Disp: 90 tablet, Rfl: 3    desoximetasone (TOPICORT) 0.05 % cream, , Disp: , Rfl:     donepezil (ARICEPT) 5 MG tablet, Take 1 tablet (5 mg total) by mouth once daily., Disp: 90 tablet, Rfl: 1    glucosamine sulfate 2KCl 1,000 mg Tab, Take by mouth., Disp: , Rfl:     lisinopril (PRINIVIL,ZESTRIL) 5 MG tablet, Take 1 tablet (5 mg total) by mouth once daily., Disp: 90 tablet, Rfl: 1    melatonin 10 mg Tab, Take 10 mg by mouth every evening., Disp: 50 tablet, Rfl: 3    memantine (NAMENDA) 10 MG Tab, Take 1 tablet (10 mg total) by mouth 2 (two) times daily., Disp: 180 tablet, Rfl: 1    omega-3 acid ethyl esters (LOVAZA) 1 gram capsule, Take 1 capsule (1,000 mg total) by mouth 2 (two) times daily., Disp: 180 capsule, Rfl: 1    oxybutynin (DITROPAN) 5 MG Tab, Take 1 tablet (5 mg total) by mouth once daily., Disp: 90 tablet, Rfl: 1    simvastatin (ZOCOR) 40 MG tablet, Take 1 tablet (40 mg total) by mouth once daily., Disp: 90 tablet, Rfl: 1  No current facility-administered medications for this visit.     Review of Systems   Constitutional: Negative for chills and fever.   Respiratory: Negative for cough and shortness of  "breath.    Cardiovascular: Negative for chest pain.   Gastrointestinal: Negative for abdominal pain and diarrhea.   Genitourinary: Negative for dysuria.   Skin: Positive for wound.   Psychiatric/Behavioral: Positive for confusion.          Objective:      Vitals:    02/03/20 1537   BP: 132/60   Pulse: 72   Weight: 85.3 kg (188 lb)   Height: 5' 6.5" (1.689 m)     Physical Exam   Constitutional: He appears well-developed and well-nourished.   Cardiovascular: Normal rate and regular rhythm.   Murmur heard.  Pulmonary/Chest: Effort normal and breath sounds normal. No respiratory distress. He has no rales.   Musculoskeletal: He exhibits no edema.   Skin: Skin is warm and dry.              Assessment:       1. Infected sebaceous cyst    2. Screening-pulmonary TB    3. Alzheimer's dementia without behavioral disturbance, unspecified timing of dementia onset           Plan:       Infected sebaceous cyst  -     Incision & Drainage  -I&D performed with effective decompression, no oral antibiotics needed.  Son advised keep dressing in place and will follow up in 2 days for wick removal    Screening-pulmonary TB  -     tuberculin injection 5 Units    Alzheimer's dementia without behavioral disturbance, unspecified timing of dementia onset  -stable, family working on getting him moved into assisted living facility.  Ppd skin test placed today    Follow up in about 2 days (around 2/5/2020) for wound check/PPD read.        2/3/2020 Maryellen Sewell NP    "

## 2020-02-05 NOTE — PROGRESS NOTES
SUBJECTIVE:    Patient ID: Kaila Bear is a 84 y.o. male.    Chief Complaint: Wound Check (2 day f/u // reading tb ac)    Patient here for 2 day wound check.  Patient here with his son.  Status post I and D of infected sebaceous cyst to his back 2 days ago and small packing/wick was placed.  Son denies any issues with wound over the past 2 days the dressing has remained dry and patient has not been complaining of any significant pain.  Patient does have history of dementia so is unable to provide any real accurate information      No visits with results within 6 Month(s) from this visit.   Latest known visit with results is:   Lab Visit on 01/26/2017   Component Date Value Ref Range Status    C. diff Antigen 01/26/2017 Negative   Final    C difficile Toxins A+B, EIA 01/26/2017 Negative  Negative Final    Stool Culture 01/26/2017 No Salmonella,Shigella,Vibrio,Campylobacter,Yersinia isolated.   Final    Stool Exam-Ova,Cysts,Parasites 01/26/2017 No ova or parasites seen   Final    Stool WBC 01/26/2017 No neutrophils seen  No neutrophils seen Final    Giardia Antigen - EIA 01/26/2017 Negative  Negative Final    Cryptosporidium Antigen 01/26/2017 Negative  Negative Final    Shiga Toxin 1 E.coli 01/26/2017 Negative   Final    Shiga Toxin 2 E.coli 01/26/2017 Negative   Final       Past Medical History:   Diagnosis Date    Allergy     Anticoagulant long-term use     Arthritis     Cancer     prostate    Cataract     Coronary artery disease     Hepatitis     Hyperlipidemia     Hypertension      Past Surgical History:   Procedure Laterality Date    BREAST SURGERY  2000    partial masectomy left breast    COLONOSCOPY W/ POLYPECTOMY  oct 2011    corotid artery      EYE SURGERY      slaegle/ bilateral    PROSTATE SURGERY  2000    radical      Family History   Problem Relation Age of Onset    Alzheimer's disease Mother     Kidney disease Mother     Heart disease Father     Parkinsonism Brother         Marital Status:   Alcohol History:  reports that he does not drink alcohol.  Tobacco History:  reports that he quit smoking about 39 years ago. He has never used smokeless tobacco.  Drug History:  reports that he does not use drugs.    Health Maintenance Topics with due status: Not Due       Topic Last Completion Date    TETANUS VACCINE 12/19/2012     Immunization History   Administered Date(s) Administered    Influenza - High Dose - PF (65 years and older) 09/27/2012, 10/18/2013, 10/06/2015, 10/06/2016, 11/14/2017, 11/15/2018    Influenza Whole 09/18/2007    PPD Test 02/03/2020    Pneumococcal Conjugate - 13 Valent 10/06/2016    Pneumococcal Polysaccharide - 23 Valent 11/08/2010    Tdap 12/19/2012    Zoster 05/01/2013       Review of patient's allergies indicates:   Allergen Reactions    Bactrim [sulfamethoxazole-trimethoprim]     Morphine      Other reaction(s): Hallucinations    Sulfa (sulfonamide antibiotics)      Other reaction(s): anxient  Other reaction(s): anxient    Trazodone hcl        Current Outpatient Medications:     aspirin 81 mg Tab, Every day, Disp: , Rfl:     B2/vits A,C,E/lut/zeaxanth/min (ICAPS ORAL), Take by mouth., Disp: , Rfl:     cyanocobalamin, vitamin B-12, 5,000 mcg Subl, Place 5,000 mcg under the tongue once daily., Disp: 90 tablet, Rfl: 3    desoximetasone (TOPICORT) 0.05 % cream, , Disp: , Rfl:     donepezil (ARICEPT) 5 MG tablet, Take 1 tablet (5 mg total) by mouth once daily., Disp: 90 tablet, Rfl: 1    doxycycline (VIBRAMYCIN) 100 MG Cap, Take 1 capsule (100 mg total) by mouth every 12 (twelve) hours., Disp: 14 capsule, Rfl: 0    glucosamine sulfate 2KCl 1,000 mg Tab, Take by mouth., Disp: , Rfl:     lisinopril (PRINIVIL,ZESTRIL) 5 MG tablet, Take 1 tablet (5 mg total) by mouth once daily., Disp: 90 tablet, Rfl: 1    melatonin 10 mg Tab, Take 10 mg by mouth every evening., Disp: 50 tablet, Rfl: 3    memantine (NAMENDA) 10 MG Tab, Take 1 tablet (10 mg  "total) by mouth 2 (two) times daily., Disp: 180 tablet, Rfl: 1    mupirocin (BACTROBAN) 2 % ointment, Apply topically once daily., Disp: 22 g, Rfl: 0    omega-3 acid ethyl esters (LOVAZA) 1 gram capsule, Take 1 capsule (1,000 mg total) by mouth 2 (two) times daily., Disp: 180 capsule, Rfl: 1    oxybutynin (DITROPAN) 5 MG Tab, Take 1 tablet (5 mg total) by mouth once daily., Disp: 90 tablet, Rfl: 1    simvastatin (ZOCOR) 40 MG tablet, Take 1 tablet (40 mg total) by mouth once daily., Disp: 90 tablet, Rfl: 1    Review of Systems   Constitutional: Negative for appetite change, chills and fever.          Objective:      Vitals:    02/05/20 1524   BP: 128/60   Pulse: 60   Weight: 82.6 kg (182 lb)   Height: 5' 6.5" (1.689 m)     Physical Exam   Constitutional: He appears well-developed and well-nourished.   Skin:              Assessment:       1. Infected sebaceous cyst           Plan:       Infected sebaceous cyst  -     doxycycline (VIBRAMYCIN) 100 MG Cap; Take 1 capsule (100 mg total) by mouth every 12 (twelve) hours.  Dispense: 14 capsule; Refill: 0  -     mupirocin (BACTROBAN) 2 % ointment; Apply topically once daily.  Dispense: 22 g; Refill: 0  -packing removed with some residual purulent drainage from site and surrounding erythema therefore will add antibiotics and son advised to clean the area daily with soap and water and apply mupirocin.  Cautioned to call for any worsening    Follow up if symptoms worsen or fail to improve, for as scheduled.        2/5/2020 Maryellen Sewell NP    "

## 2020-03-02 NOTE — TELEPHONE ENCOUNTER
Rec'd fax from [facility] @ [facility] stating that family reports pt is no longer taking Melatonin or Lovaza.  However, pt is taking Glucosamine Sulfate 1000 mg po BID.    Per Dr. Lancaster, ok to discharge Melatonin and Lovaza and to ADD Glucosamine Sulfate 1000 mg po BID     Faxed order to [facility].

## 2020-03-03 NOTE — TELEPHONE ENCOUNTER
My Chart Mess not read-lab due prior to 3/12 ov. Called cell, number won't go through. Called home and spoke to Don. Said that his dad was admitted to [facility] yesterday. Has office visit next week with Luli, but wants to know if Dr Lancaster does visits to that facility or does he need to check him out to come to our office. Said that he does need refills. Also wants to know how to handle lab work, can it be done at [place], or does he need to come to our office. Call son, Don [phone]

## 2020-03-03 NOTE — TELEPHONE ENCOUNTER
Called pt son anival to let him know  said he can come to [facility] to check on him, no need to bring him into the office

## 2020-03-06 NOTE — TELEPHONE ENCOUNTER
Please let family know we can start citalopram 10mg daily for depression and use xanax 0.25mg twice a day as needed for anxiety- it will take the citalopram a few weeks to really kick in. Clarify where we send RX to- facility or pharmacy?

## 2020-03-06 NOTE — TELEPHONE ENCOUNTER
----- Message from Alissa Ceja sent at 3/6/2020  9:37 AM CST -----  Contact: Don Bear   Patients son needs a call back . They put the patient in a memory care facilty he has a lot of anxiety and depression going on lately and he wants to know if something can be prescribed for him locally and not through the mail order. [phone]

## 2020-03-12 NOTE — PROGRESS NOTES
SUBJECTIVE:    Patient ID: Kaila Bear is a 84 y.o. male.    Chief Complaint: Follow-up (did not bring bottles, lives at [facility]. )    This 84-year-old male with Alzheimer dementia has recently been admitted to [facility] assisted living.  His son is trying to organize and clean the patient's house to get her ready for sale.  Patient is having some adjustment difficulties in his 1st to 1 or 2 weeks at the new facility.  Due to his Alzheimer dementia this is expected.  He has had decreased appetite and has lost approximately 7 lb.  His family is providing snacks and has treats in the refrigerator in his room.  Mentally is continuing to decline slowly.  He has a slow gait with a Rollator and has decreased ambulation.  He has had no recent falls.      No visits with results within 6 Month(s) from this visit.   Latest known visit with results is:   Lab Visit on 01/26/2017   Component Date Value Ref Range Status    C. diff Antigen 01/26/2017 Negative   Final    C difficile Toxins A+B, EIA 01/26/2017 Negative  Negative Final    Stool Culture 01/26/2017 No Salmonella,Shigella,Vibrio,Campylobacter,Yersinia isolated.   Final    Stool Exam-Ova,Cysts,Parasites 01/26/2017 No ova or parasites seen   Final    Stool WBC 01/26/2017 No neutrophils seen  No neutrophils seen Final    Giardia Antigen - EIA 01/26/2017 Negative  Negative Final    Cryptosporidium Antigen 01/26/2017 Negative  Negative Final    Shiga Toxin 1 E.coli 01/26/2017 Negative   Final    Shiga Toxin 2 E.coli 01/26/2017 Negative   Final       Past Medical History:   Diagnosis Date    Allergy     Anticoagulant long-term use     Arthritis     Cancer     prostate    Cataract     Coronary artery disease     Hepatitis     Hyperlipidemia     Hypertension      Past Surgical History:   Procedure Laterality Date    BREAST SURGERY  2000    partial masectomy left breast    COLONOSCOPY W/ POLYPECTOMY  oct 2011    corotid artery      EYE SURGERY       slaegle/ bilateral    PROSTATE SURGERY  2000    radical      Family History   Problem Relation Age of Onset    Alzheimer's disease Mother     Kidney disease Mother     Heart disease Father     Parkinsonism Brother        Marital Status:   Alcohol History:  reports that he does not drink alcohol.  Tobacco History:  reports that he quit smoking about 39 years ago. He has never used smokeless tobacco.  Drug History:  reports that he does not use drugs.    Review of patient's allergies indicates:   Allergen Reactions    Bactrim [sulfamethoxazole-trimethoprim]     Morphine      Other reaction(s): Hallucinations    Sulfa (sulfonamide antibiotics)      Other reaction(s): anxient  Other reaction(s): anxient    Trazodone hcl        Current Outpatient Medications:     aspirin 81 mg Tab, Every day, Disp: , Rfl:     B2/vits A,C,E/lut/zeaxanth/min (ICAPS ORAL), Take by mouth., Disp: , Rfl:     citalopram (CELEXA) 10 MG tablet, Take 1 tablet (10 mg total) by mouth once daily., Disp: 30 tablet, Rfl: 11    cyanocobalamin, vitamin B-12, 5,000 mcg Subl, Place 5,000 mcg under the tongue once daily., Disp: 90 tablet, Rfl: 3    desoximetasone (TOPICORT) 0.05 % cream, , Disp: , Rfl:     donepeziL (ARICEPT) 5 MG tablet, Take 1 tablet (5 mg total) by mouth once daily., Disp: 90 tablet, Rfl: 1    doxycycline (VIBRAMYCIN) 100 MG Cap, Take 1 capsule (100 mg total) by mouth every 12 (twelve) hours., Disp: 14 capsule, Rfl: 0    glucosamine sulfate 1,000 mg Cap, Take 1 capsule by mouth 2 (two) times daily., Disp: , Rfl:     glucosamine sulfate 2KCl 1,000 mg Tab, Take by mouth., Disp: , Rfl:     lisinopriL (PRINIVIL,ZESTRIL) 5 MG tablet, Take 1 tablet (5 mg total) by mouth once daily., Disp: 90 tablet, Rfl: 1    memantine (NAMENDA) 10 MG Tab, Take 1 tablet (10 mg total) by mouth 2 (two) times daily., Disp: 180 tablet, Rfl: 1    mupirocin (BACTROBAN) 2 % ointment, Apply topically once daily., Disp: 22 g, Rfl: 0     "oxybutynin (DITROPAN) 5 MG Tab, Take 1 tablet (5 mg total) by mouth once daily., Disp: 90 tablet, Rfl: 1    simvastatin (ZOCOR) 40 MG tablet, Take 1 tablet (40 mg total) by mouth once daily., Disp: 90 tablet, Rfl: 1    Review of Systems   Constitutional: Negative for appetite change, chills, fatigue, fever and unexpected weight change.   HENT: Negative for congestion, ear pain and trouble swallowing.    Eyes: Negative for pain, discharge and visual disturbance.   Respiratory: Negative for apnea, cough, shortness of breath and wheezing.    Cardiovascular: Negative for chest pain and leg swelling.   Gastrointestinal: Negative for abdominal pain, blood in stool, constipation, diarrhea, nausea and vomiting.   Endocrine: Negative for cold intolerance, heat intolerance and polydipsia.   Genitourinary: Negative for dysuria, hematuria, testicular pain and urgency.        Continent with urine   Musculoskeletal: Negative for gait problem, joint swelling and myalgias.   Neurological: Negative for dizziness, seizures and numbness.   Psychiatric/Behavioral: Positive for dysphoric mood (depressed mood  adjusting to new accomodations). Negative for agitation, behavioral problems and hallucinations. The patient is not nervous/anxious.           Objective:      Vitals:    03/12/20 1143   BP: 124/70   Pulse: 76   Weight: 83 kg (183 lb)   Height: 5' 6.5" (1.689 m)     Body mass index is 29.09 kg/m².  Physical Exam   Constitutional: He is oriented to person, place, and time. He appears well-developed and well-nourished.   Elderly male somewhat anxious and disgruntled   HENT:   Head: Normocephalic and atraumatic.   Right Ear: External ear normal.   Left Ear: External ear normal.   Nose: Nose normal.   Mouth/Throat: Oropharynx is clear and moist.   Eyes: Pupils are equal, round, and reactive to light. EOM are normal.   Neck: Normal range of motion. Neck supple. Carotid bruit is not present. No thyromegaly present.   Cardiovascular: " Normal rate, regular rhythm and intact distal pulses.   No murmur heard.  Grade 2/6 systolic murmur   Pulmonary/Chest: Effort normal and breath sounds normal. He has no wheezes. He has no rales.   Abdominal: Soft. Bowel sounds are normal. He exhibits no distension. There is no hepatosplenomegaly. There is no tenderness.   Musculoskeletal: Normal range of motion. He exhibits no tenderness or deformity.        Lumbar back: Normal. He exhibits no pain and no spasm.   Bends 90 degrees at  waist, shoulders and knees are rather stiff and somewhat decreased in their flexibility.  He does have 1+ pitting edema to his pretibial area   Lymphadenopathy:     He has no cervical adenopathy.   Neurological: He is alert and oriented to person, place, and time. No cranial nerve deficit. Coordination normal.   Has slow shuffling gait with a stooped forward posture on his Rollator   Skin: Skin is warm and dry. No rash noted.   Psychiatric: He has a normal mood and affect. His behavior is normal. Judgment and thought content normal.   Nursing note and vitals reviewed.        Assessment:       1. Late onset Alzheimer's disease without behavioral disturbance    2. Prostate cancer    3. Crohn's disease of colon without complication    4. Hearing difficulty of both ears    5. S/P carotid endarterectomy, 2010, left occluded    6. Mixed hyperlipidemia    7. Coronary artery disease involving native coronary artery without angina pectoris, unspecified whether native or transplanted heart    8. Bradycardia    9. Urinary frequency    10. OAB (overactive bladder)    11. Gastroesophageal reflux disease without esophagitis    12. Primary osteoarthritis involving multiple joints    13. Gait disturbance    14. Chronic fatigue    15. Hyperlipidemia, unspecified hyperlipidemia type    16. Dementia without behavioral disturbance, unspecified dementia type    17. Hypertension, unspecified type    18. Reactive depression         Plan:       Late onset  Alzheimer's disease without behavioral disturbance  Continue current Alzheimer medications.  If behaviors get worse we may have a consult with the home health agency  Prostate cancer  In remission times many years  Crohn's disease of colon without complication  In remission times several years  Hearing difficulty of both ears  Significant hearing loss, not able to use hearing aids and adequately  S/P carotid endarterectomy, 2010, left occluded    Mixed hyperlipidemia  Labs ordered for today  Coronary artery disease involving native coronary artery without angina pectoris, unspecified whether native or transplanted heart  No angina lately  Bradycardia    Urinary frequency  -     oxybutynin (DITROPAN) 5 MG Tab; Take 1 tablet (5 mg total) by mouth once daily.  Dispense: 90 tablet; Refill: 1    OAB (overactive bladder)    Gastroesophageal reflux disease without esophagitis    Primary osteoarthritis involving multiple joints  -     glucosamine sulfate 1,000 mg Cap; Take 1 capsule by mouth 2 (two) times daily.  Resume glucosamine for knee arthritis  Gait disturbance  Continue Rollator use for balance  Chronic fatigue    Hyperlipidemia, unspecified hyperlipidemia type  -     simvastatin (ZOCOR) 40 MG tablet; Take 1 tablet (40 mg total) by mouth once daily.  Dispense: 90 tablet; Refill: 1    Dementia without behavioral disturbance, unspecified dementia type  -     memantine (NAMENDA) 10 MG Tab; Take 1 tablet (10 mg total) by mouth 2 (two) times daily.  Dispense: 180 tablet; Refill: 1  -     donepeziL (ARICEPT) 5 MG tablet; Take 1 tablet (5 mg total) by mouth once daily.  Dispense: 90 tablet; Refill: 1    Hypertension, unspecified type  -     lisinopriL (PRINIVIL,ZESTRIL) 5 MG tablet; Take 1 tablet (5 mg total) by mouth once daily.  Dispense: 90 tablet; Refill: 1  Blood pressure stable at this time  Reactive depression  -     citalopram (CELEXA) 10 MG tablet; Take 1 tablet (10 mg total) by mouth once daily.  Dispense: 30  tablet; Refill: 11  Citalopram added for decreased moods    Follow up in about 6 months (around 9/12/2020) for htn,Alzheimers.

## 2020-03-18 NOTE — TELEPHONE ENCOUNTER
----- Message from Hugh Lancaster MD sent at 3/18/2020  9:46 AM CDT -----  Call patient's son.  Mr. Bear's blood work looks excellent.  Cholesterol is fine at 150.  Sugar, kidneys,thyroid and liver all look stable.  His PSA is still undetectable therefore prostate cancer is still gone.  Encourage increase fluids to to drink daily.

## 2020-03-20 NOTE — TELEPHONE ENCOUNTER
----- Message from Amara Mooney MA sent at 3/20/2020 10:31 AM CDT -----  Contact: Pt's son - Don Bear  Pt would like refill:    Xanax (unsure of dose)    Pharmacy - [facility]    Son - [phone]

## 2020-03-26 NOTE — TELEPHONE ENCOUNTER
----- Message from Umu Walker sent at 3/26/2020 10:54 AM CDT -----  Contact: Don hahn's son  Son would like to have his father taken off of the Alprazolam and is having frequent falls in the evening . He says that he also needs orders for Physical therapy as well, because he's not walking correctly. Please give the son Don a call back.   #[phone]

## 2020-04-14 NOTE — TELEPHONE ENCOUNTER
Spoke with Kaleb at [facility] and gave her orders per Dr. Lancaster to set up a portable xray of the left hip/pelvis

## 2020-04-24 PROBLEM — Z51.5 COMFORT MEASURES ONLY STATUS: Status: ACTIVE | Noted: 2020-01-01

## 2020-04-24 PROBLEM — E87.5 HYPERKALEMIA: Status: ACTIVE | Noted: 2020-01-01

## 2020-04-24 PROBLEM — J18.9 PNEUMONIA OF BOTH LUNGS DUE TO INFECTIOUS ORGANISM: Status: ACTIVE | Noted: 2020-01-01

## 2020-04-24 PROBLEM — E87.0 HYPERNATREMIA: Status: ACTIVE | Noted: 2020-01-01

## 2020-04-24 PROBLEM — R65.21 SEPTIC SHOCK: Status: ACTIVE | Noted: 2020-01-01

## 2020-04-24 PROBLEM — U07.1 PNEUMONIA DUE TO COVID-19 VIRUS: Status: ACTIVE | Noted: 2020-01-01

## 2020-04-24 PROBLEM — E83.39 HYPERPHOSPHATEMIA: Status: ACTIVE | Noted: 2020-01-01

## 2020-04-24 PROBLEM — A41.9 SEPTIC SHOCK: Status: ACTIVE | Noted: 2020-01-01

## 2020-04-24 PROBLEM — F03.90 DEMENTIA WITHOUT BEHAVIORAL DISTURBANCE: Chronic | Status: ACTIVE | Noted: 2020-01-01

## 2020-04-24 PROBLEM — J12.82 PNEUMONIA DUE TO COVID-19 VIRUS: Status: ACTIVE | Noted: 2020-01-01

## 2020-04-24 PROBLEM — G93.41 ENCEPHALOPATHY, METABOLIC: Status: ACTIVE | Noted: 2020-01-01

## 2020-04-24 PROBLEM — N17.9 ACUTE RENAL FAILURE: Status: ACTIVE | Noted: 2020-01-01

## 2020-04-24 PROBLEM — J96.01 ACUTE HYPOXEMIC RESPIRATORY FAILURE: Status: ACTIVE | Noted: 2020-01-01

## 2020-04-24 NOTE — TELEPHONE ENCOUNTER
----- Message from Umu Walker sent at 4/24/2020  9:32 AM CDT -----  Contact: Don hahn's son  Son says that the nursing home his father is in told him that the patient is having signs of COVID-19 and he would like to know where should he bring him or do in regards to getting him tested. Please give him a call back as soon as possible.   Don's# [phone]

## 2020-04-24 NOTE — H&P
[facility]  History & Physical    Patient Name: Kaila Bear  MRN: [id number]  Admission Date: 4/24/2020  Attending Physician: Tray Greenberg MD   Primary Care Provider: Hugh Lancaster MD         Patient information was obtained from relative(s), past medical records and ER records.     Subjective:     Principal Problem:Acute hypoxemic respiratory failure    Chief Complaint:   Chief Complaint   Patient presents with    Altered Mental Status     over the last 2 weeks with generalized weakness        HPI: Mr. Bear is an 84 years old male who was brought in to the ED via private vehicle (daughter in law Yue) from [facility] due to altered mental status and weakness. Patient was tested positive for coronavirus, on BiPAP, has dementia and therefore collateral history obtained from daughter in law over the phone as well as ED provider. Daughter-in-law Yue Bear, cell number [phone]. Examination was performed from outside the room through glass door due to the pandemic. Patient lives in the memory unit at [facility] and was last seen well about 2 weeks ago, however due to covid pandemic visitation to the facility was restricted. Patient reportedly had decreased oral intake with change in mental status over the preceding 2-3 days.  He also developed increased respiratory distress and there was a concern that he may have corona virus. Family was very concerned and therefore decided to personally bring the patient to the ED for further evaluation.  Initially on arrival to the ED patient was noted to be in respiratory distress, confused, grunting, O2 saturations 80% on room air.  He was subsequently placed on non-rebreather and then BiPAP.  He is hypotensive with systolics in the 70s.  Labs with WBC 14, ferritin 1785, CRP 13, sodium 154 potassium 5.2, creatinine 4.3, BNP 1203, lactic acid 2.5, troponin 0.062, procalcitonin 0.28.  EKG sinus rhythm 90  beats per minute, .  Chest x-ray with bilateral ill-defined opacities right greater than left.  CT head atrophy diffusely with slight increase in ventricular size.  He was ordered for Levaquin 750, Zosyn as well as vancomycin.  Case discussed with the ED provider, patient is a confirmed DNR/DNI.    Called and personally discussed with patient's daughter-in-law Yue over the phone, discussed clinical status and current clinical findings including respiratory failure, corona virus positive, renal failure, electrolyte abnormalities, hypotension, discussed recommendations to transition to comfort directed care which she is agreeable.  States her main goal is to keep patient comfortable knowing that he will  soon.  Noted morphine listed as allergy with reaction of hallucinations however she is unsure of same, discussed using dilaudid as an alternative.  She states patient primary care provider is Dr. Hugh Lancaster who is also a close friend, Dr. Greenberg did also call and discuss with PCP.    Past Medical History:   Diagnosis Date    Allergy     Anticoagulant long-term use     Arthritis     Cancer     prostate    Cataract     Coronary artery disease     Hepatitis     Hyperlipidemia     Hypertension        Past Surgical History:   Procedure Laterality Date    BREAST SURGERY      partial masectomy left breast    COLONOSCOPY W/ POLYPECTOMY  oct 2011    corotid artery      EYE SURGERY      slaegle/ bilateral    PROSTATE SURGERY  2000    radical        Review of patient's allergies indicates:   Allergen Reactions    Bactrim [sulfamethoxazole-trimethoprim]     Morphine      Other reaction(s): Hallucinations    Sulfa (sulfonamide antibiotics)      Other reaction(s): anxient  Other reaction(s): anxient    Trazodone hcl        No current facility-administered medications on file prior to encounter.      Current Outpatient Medications on File Prior to Encounter   Medication Sig    ALPRAZolam  (XANAX) 0.5 MG tablet Take 1 tablet (0.5 mg total) by mouth 2 (two) times daily as needed for Anxiety.    aspirin 81 mg Tab Take 81 mg by mouth once daily. Every day    citalopram (CELEXA) 10 MG tablet Take 1 tablet (10 mg total) by mouth once daily.    glucosamine sulfate 2KCl 1,000 mg Tab Take 1,000 mg by mouth 2 (two) times daily.     lisinopriL (PRINIVIL,ZESTRIL) 5 MG tablet Take 1 tablet (5 mg total) by mouth once daily.    megestroL (MEGACE) 40 MG Tab Take 40 mg by mouth 2 (two) times daily.    memantine (NAMENDA) 10 MG Tab Take 1 tablet (10 mg total) by mouth 2 (two) times daily.    oxybutynin (DITROPAN) 5 MG Tab Take 1 tablet (5 mg total) by mouth once daily.    simvastatin (ZOCOR) 40 MG tablet Take 1 tablet (40 mg total) by mouth once daily.    acetaminophen (TYLENOL) 500 MG tablet Take 1,000 mg by mouth every 6 (six) hours as needed for Pain.    B2/vits A,C,E/lut/zeaxanth/min (ICAPS ORAL) Take by mouth.    cyanocobalamin, vitamin B-12, 5,000 mcg Subl Place 5,000 mcg under the tongue once daily.    desoximetasone (TOPICORT) 0.05 % cream     donepeziL (ARICEPT) 5 MG tablet Take 1 tablet (5 mg total) by mouth once daily.    doxycycline (VIBRAMYCIN) 100 MG Cap Take 1 capsule (100 mg total) by mouth every 12 (twelve) hours.    glucosamine sulfate 1,000 mg Cap Take 1 capsule by mouth 2 (two) times daily.    mupirocin (BACTROBAN) 2 % ointment Apply topically once daily.     Family History     Problem Relation (Age of Onset)    Alzheimer's disease Mother    Heart disease Father    Kidney disease Mother    Parkinsonism Brother        Tobacco Use    Smoking status: Former Smoker     Last attempt to quit: 1980     Years since quittin.5    Smokeless tobacco: Never Used    Tobacco comment: 20 PPP   Substance and Sexual Activity    Alcohol use: No    Drug use: No    Sexual activity: Yes     Partners: Female     Review of Systems   Unable to perform ROS: Mental status change     Objective:      Vital Signs (Most Recent):  Pulse: 93 (04/24/20 1343)  Resp: (!) 27 (04/24/20 1254)  BP: (!) 79/37 (04/24/20 1343)  SpO2: 97 % (04/24/20 1343) Vital Signs (24h Range):  Pulse:  [89-94] 93  Resp:  [18-27] 27  SpO2:  [79 %-97 %] 97 %  BP: ()/(37-71) 79/37     Weight: 81.6 kg (180 lb)  Body mass index is 26.58 kg/m².    Physical Exam   Constitutional:   Elderly frail male lying in stretcher, restless/fidgeting   HENT:   Head: Normocephalic and atraumatic.   On BiPAP   Cardiovascular:   Regular   Pulmonary/Chest:   On BiPAP- coarse breath sounds bilaterally as per ED provider, transmitted upper airway sounds   Genitourinary:   Genitourinary Comments: Bennett catheter present   Neurological:   Alert, spontaneous movement of all four extremities   Psychiatric:   Unable to evalutate   Vitals reviewed.          Significant Labs:   ABGs:   Recent Labs   Lab 04/24/20  1202   PH 7.380   PCO2 43.2   HCO3 25.6   POCSATURATED 30*   BE 0     Bilirubin:   Recent Labs   Lab 04/24/20  1115   BILITOT 1.0     Blood Culture: No results for input(s): LABBLOO in the last 48 hours.  BMP:   Recent Labs   Lab 04/24/20  1115   *   *   K 5.2*   *   CO2 22*   *   CREATININE 4.3*   CALCIUM 9.3   MG 3.3*     CBC:   Recent Labs   Lab 04/24/20  1115   WBC 14.05*   HGB 16.1   HCT 52.1        CMP:   Recent Labs   Lab 04/24/20  1115   *   K 5.2*   *   CO2 22*   *   *   CREATININE 4.3*   CALCIUM 9.3   PROT 7.7   ALBUMIN 3.4*   BILITOT 1.0   ALKPHOS 93   AST 36   ALT 18   ANIONGAP 14   EGFRNONAA 11.9*     Cardiac Markers:   Recent Labs   Lab 04/24/20  1115   BNP 1,203*     Coagulation:   Recent Labs   Lab 04/24/20  1115   INR 1.5   APTT 27.8     Lactic Acid:   Recent Labs   Lab 04/24/20  1140   LACTATE 2.5*     Lipase: No results for input(s): LIPASE in the last 48 hours.  Lipid Panel: No results for input(s): CHOL, HDL, LDLCALC, TRIG, CHOLHDL in the last 48 hours.  Magnesium:   Recent  Labs   Lab 04/24/20  1115   MG 3.3*     POCT Glucose: No results for input(s): POCTGLUCOSE in the last 48 hours.  Respiratory Culture: No results for input(s): GSRESP, RESPIRATORYC in the last 48 hours.  Troponin:   Recent Labs   Lab 04/24/20  1115   TROPONINI 0.062*     TSH: No results for input(s): TSH in the last 4320 hours.  Urine Culture: No results for input(s): LABURIN in the last 48 hours.  Urine Studies: No results for input(s): COLORU, APPEARANCEUA, PHUR, SPECGRAV, PROTEINUA, GLUCUA, KETONESU, BILIRUBINUA, OCCULTUA, NITRITE, UROBILINOGEN, LEUKOCYTESUR, RBCUA, WBCUA, BACTERIA, SQUAMEPITHEL, HYALINECASTS in the last 48 hours.    Invalid input(s): WRIGHTSUR  All pertinent labs within the past 24 hours have been reviewed.    Significant Imaging: I have reviewed all pertinent imaging results/findings within the past 24 hours.     Ct Head Without Contrast    Result Date: 4/24/2020  EXAMINATION: CT HEAD WITHOUT CONTRAST CLINICAL HISTORY: Mental status changes; TECHNIQUE: CMS Mandated Quality Data-CT Radiation Dose-436 All CT scans at this facility dose modulation, iterative reconstruction, and or weight-based dosing when appropriate to reduce radiation dose to as low as reasonably achievable. COMPARISON: None FINDINGS: Negative for acute intracranial hemorrhage, midline shift, or mass effect.  Cerebral atrophy with disproportion enlargement of the lateral and 3rd ventricles.  Periventricular white matter hypoattenuation, nonspecific, but most commonly associated with microangiopathic change..  Cerebellar hemispheres and brainstem are unremarkable.  Intracranial carotid artery atherosclerosis. No calvarial lesion or fracture.  Neck visualized paranasal sinuses and mastoid air cells are clear.     No CT evidence of acute intracranial pathology. Cerebral atrophy with slightly disproportionate ventricular enlargement.  Normal pressure hydrocephalus is a possibility. Electronically signed by: Reed Draper MD  Date:    04/24/2020 Time:    13:09    X-ray Chest Ap Portable    Result Date: 4/24/2020  EXAMINATION: XR CHEST AP PORTABLE CLINICAL HISTORY: Altered mental status, unspecified FINDINGS: Portable chest with comparison chest x-ray 11/09/2018.  Normal cardiomediastinal silhouette.Bilateral hazy ill-defined opacities are demonstrated, significantly more pronounced in the right lung compared to the left.  Pulmonary vasculature is normal. No acute osseous abnormality.     Bilateral hazy ill-defined opacities demonstrated, significantly more pronounced in the right lung.  Findings may reflect infectious process or pulmonary edema. Electronically signed by: Parveen Gorman MD Date:    04/24/2020 Time:    11:35  ECG Results          EKG 12-lead (In process)  Result time 04/24/20 11:26:58    In process by Interface, Lab In [facility] (04/24/20 11:26:58)                 Narrative:    Test Reason : R06.00,    Vent. Rate : 090 BPM     Atrial Rate : 090 BPM     P-R Int : 134 ms          QRS Dur : 120 ms      QT Int : 376 ms       P-R-T Axes : 090 -66 063 degrees     QTc Int : 459 ms    Normal sinus rhythm  Pulmonary disease pattern  Right bundle branch block  Left anterior fascicular block   Bifascicular block   Septal infarct (cited on or before 24-APR-2020)  Abnormal ECG  When compared with ECG of 24-APR-2020 11:10,  No significant change was found    Referred By: AAAREFERR   SELF           Confirmed By:                                 Assessment/Plan:     Assessment  Acute hypoxic respiratory failure  Bilateral pneumonia due to corona virus  COVID-19 positive  Encephalopathy, multifactorial, hypoxemia, infection, renal failure, hypernatremia on a background history of dementia  Shock, septic  Acute renal failure  Hypernatremia  Hyperkalemia  Hyperphosphatemia  Alzheimer's dementia without behavioral disturbance, nursing home resident  Comfort care measures only    Plan  Following discussion with patient daughter in-law Yue  confirmed DNR/DNI and transitioning to comfort directed care.   Discussed medications and using dilaudid (no morphine given documented hallucinations) and ativan.   Discussed discontinuing BIPAP and using NC/facemask as needed.  As per primary care provider, if hospice services needed, would want [facility]- did not consult on admission and suspect patient may  overnight, but if needed please consult case management tomorrow to assist. He would meet inpatient hospice criteria.   Continuing airborne/contact precautions  Daughter in law aware of positive covid 19 status and need for precaution  Called and informed [facility], day nurse Justina, and she will inform administration/necessary personnel at facility  Will monitor and adjust medications as needed for comfort.     VTE Risk Mitigation (From admission, onward)         Ordered     Reason for no Mechanical VTE Prophylaxis  Once     Question:  Reasons:  Answer:  Physician Provided (leave comment)  Comment:  comfort directed care    20 1349     IP VTE HIGH RISK PATIENT  Once      20 134                   Sary Weaver MD  Department of Hospital Medicine   [facility]

## 2020-04-24 NOTE — TELEPHONE ENCOUNTER
----- Message from Dorcas Mcknight sent at 4/24/2020  9:37 AM CDT -----  vm- son anival is calling about his dad, they called him last and said he has symptoms of covid and would like to know how to get him tested  [phone]

## 2020-04-24 NOTE — TELEPHONE ENCOUNTER
Spoke with pts daughter and let her know per Dr. Lancaster he is recommending he go to the ER to be tested and put on oxygen asap. She is bringing him now.

## 2020-04-24 NOTE — HPI
Mr. Bear is an 84 years old male who was brought in to the ED via private vehicle (daughter in law Yue) from [facility] due to altered mental status and weakness. Patient was tested positive for coronavirus, on BiPAP, has dementia and therefore collateral history obtained from daughter in law over the phone as well as ED provider. Daughter-in-law Yue Bear, cell number [phone]. Examination was performed from outside the room through glass door due to the pandemic. Patient lives in the memory unit at [facility] and was last seen well about 2 weeks ago, however due to covid pandemic visitation to the facility was restricted. Patient reportedly had decreased oral intake with change in mental status over the preceding 2-3 days.  He also developed increased respiratory distress and there was a concern that he may have corona virus. Family was very concerned and therefore decided to personally bring the patient to the ED for further evaluation.  Initially on arrival to the ED patient was noted to be in respiratory distress, confused, grunting, O2 saturations 80% on room air.  He was subsequently placed on non-rebreather and then BiPAP.  He is hypotensive with systolics in the 70s.  Labs with WBC 14, ferritin 1785, CRP 13, sodium 154 potassium 5.2, creatinine 4.3, BNP 1203, lactic acid 2.5, troponin 0.062, procalcitonin 0.28.  EKG sinus rhythm 90 beats per minute, .  Chest x-ray with bilateral ill-defined opacities right greater than left.  CT head atrophy diffusely with slight increase in ventricular size.  He was ordered for Levaquin 750, Zosyn as well as vancomycin.  Case discussed with the ED provider, patient is a confirmed DNR/DNI.    Called and personally discussed with patient's daughter-in-law Yue over the phone, discussed clinical status and current clinical findings including respiratory failure, corona virus positive, renal failure, electrolyte abnormalities,  hypotension, discussed recommendations to transition to comfort directed care which she is agreeable.  States her main goal is to keep patient comfortable knowing that he will  soon.  Noted morphine listed as allergy with reaction of hallucinations however she is unsure of same, discussed using dilaudid as an alternative.  She states patient primary care provider is Dr. Hugh Lancaster who is also a close friend, Dr. Greenberg did also call and discuss with PCP.

## 2020-04-24 NOTE — SUBJECTIVE & OBJECTIVE
Past Medical History:   Diagnosis Date    Allergy     Anticoagulant long-term use     Arthritis     Cancer     prostate    Cataract     Coronary artery disease     Hepatitis     Hyperlipidemia     Hypertension        Past Surgical History:   Procedure Laterality Date    BREAST SURGERY  2000    partial masectomy left breast    COLONOSCOPY W/ POLYPECTOMY  oct 2011    corotid artery      EYE SURGERY      slaegle/ bilateral    PROSTATE SURGERY  2000    radical        Review of patient's allergies indicates:   Allergen Reactions    Bactrim [sulfamethoxazole-trimethoprim]     Morphine      Other reaction(s): Hallucinations    Sulfa (sulfonamide antibiotics)      Other reaction(s): anxient  Other reaction(s): anxient    Trazodone hcl        No current facility-administered medications on file prior to encounter.      Current Outpatient Medications on File Prior to Encounter   Medication Sig    ALPRAZolam (XANAX) 0.5 MG tablet Take 1 tablet (0.5 mg total) by mouth 2 (two) times daily as needed for Anxiety.    aspirin 81 mg Tab Take 81 mg by mouth once daily. Every day    citalopram (CELEXA) 10 MG tablet Take 1 tablet (10 mg total) by mouth once daily.    glucosamine sulfate 2KCl 1,000 mg Tab Take 1,000 mg by mouth 2 (two) times daily.     lisinopriL (PRINIVIL,ZESTRIL) 5 MG tablet Take 1 tablet (5 mg total) by mouth once daily.    megestroL (MEGACE) 40 MG Tab Take 40 mg by mouth 2 (two) times daily.    memantine (NAMENDA) 10 MG Tab Take 1 tablet (10 mg total) by mouth 2 (two) times daily.    oxybutynin (DITROPAN) 5 MG Tab Take 1 tablet (5 mg total) by mouth once daily.    simvastatin (ZOCOR) 40 MG tablet Take 1 tablet (40 mg total) by mouth once daily.    acetaminophen (TYLENOL) 500 MG tablet Take 1,000 mg by mouth every 6 (six) hours as needed for Pain.    B2/vits A,C,E/lut/zeaxanth/min (ICAPS ORAL) Take by mouth.    cyanocobalamin, vitamin B-12, 5,000 mcg Subl Place 5,000 mcg under the tongue  once daily.    desoximetasone (TOPICORT) 0.05 % cream     donepeziL (ARICEPT) 5 MG tablet Take 1 tablet (5 mg total) by mouth once daily.    doxycycline (VIBRAMYCIN) 100 MG Cap Take 1 capsule (100 mg total) by mouth every 12 (twelve) hours.    glucosamine sulfate 1,000 mg Cap Take 1 capsule by mouth 2 (two) times daily.    mupirocin (BACTROBAN) 2 % ointment Apply topically once daily.     Family History     Problem Relation (Age of Onset)    Alzheimer's disease Mother    Heart disease Father    Kidney disease Mother    Parkinsonism Brother        Tobacco Use    Smoking status: Former Smoker     Last attempt to quit: 1980     Years since quittin.5    Smokeless tobacco: Never Used    Tobacco comment: 20 PPP   Substance and Sexual Activity    Alcohol use: No    Drug use: No    Sexual activity: Yes     Partners: Female     Review of Systems   Unable to perform ROS: Mental status change     Objective:     Vital Signs (Most Recent):  Pulse: 93 (20 1343)  Resp: (!) 27 (20 1254)  BP: (!) 79/37 (20 1343)  SpO2: 97 % (20 1343) Vital Signs (24h Range):  Pulse:  [89-94] 93  Resp:  [18-27] 27  SpO2:  [79 %-97 %] 97 %  BP: ()/(37-71) 79/37     Weight: 81.6 kg (180 lb)  Body mass index is 26.58 kg/m².    Physical Exam   Constitutional:   Elderly frail male lying in stretcher, restless/fidgeting   HENT:   Head: Normocephalic and atraumatic.   On BiPAP   Cardiovascular:   Regular   Pulmonary/Chest:   On BiPAP- coarse breath sounds bilaterally as per ED provider, transmitted upper airway sounds   Genitourinary:   Genitourinary Comments: Bennett catheter present   Neurological:   Alert, spontaneous movement of all four extremities   Psychiatric:   Unable to evalutate   Vitals reviewed.          Significant Labs:   ABGs:   Recent Labs   Lab 20  1202   PH 7.380   PCO2 43.2   HCO3 25.6   POCSATURATED 30*   BE 0     Bilirubin:   Recent Labs   Lab 20  1115   BILITOT 1.0     Blood  Culture: No results for input(s): LABBLOO in the last 48 hours.  BMP:   Recent Labs   Lab 04/24/20  1115   *   *   K 5.2*   *   CO2 22*   *   CREATININE 4.3*   CALCIUM 9.3   MG 3.3*     CBC:   Recent Labs   Lab 04/24/20  1115   WBC 14.05*   HGB 16.1   HCT 52.1        CMP:   Recent Labs   Lab 04/24/20  1115   *   K 5.2*   *   CO2 22*   *   *   CREATININE 4.3*   CALCIUM 9.3   PROT 7.7   ALBUMIN 3.4*   BILITOT 1.0   ALKPHOS 93   AST 36   ALT 18   ANIONGAP 14   EGFRNONAA 11.9*     Cardiac Markers:   Recent Labs   Lab 04/24/20  1115   BNP 1,203*     Coagulation:   Recent Labs   Lab 04/24/20  1115   INR 1.5   APTT 27.8     Lactic Acid:   Recent Labs   Lab 04/24/20  1140   LACTATE 2.5*     Lipase: No results for input(s): LIPASE in the last 48 hours.  Lipid Panel: No results for input(s): CHOL, HDL, LDLCALC, TRIG, CHOLHDL in the last 48 hours.  Magnesium:   Recent Labs   Lab 04/24/20  1115   MG 3.3*     POCT Glucose: No results for input(s): POCTGLUCOSE in the last 48 hours.  Respiratory Culture: No results for input(s): GSRESP, RESPIRATORYC in the last 48 hours.  Troponin:   Recent Labs   Lab 04/24/20  1115   TROPONINI 0.062*     TSH: No results for input(s): TSH in the last 4320 hours.  Urine Culture: No results for input(s): LABURIN in the last 48 hours.  Urine Studies: No results for input(s): COLORU, APPEARANCEUA, PHUR, SPECGRAV, PROTEINUA, GLUCUA, KETONESU, BILIRUBINUA, OCCULTUA, NITRITE, UROBILINOGEN, LEUKOCYTESUR, RBCUA, WBCUA, BACTERIA, SQUAMEPITHEL, HYALINECASTS in the last 48 hours.    Invalid input(s): WRIGHTSUR  All pertinent labs within the past 24 hours have been reviewed.    Significant Imaging: I have reviewed all pertinent imaging results/findings within the past 24 hours.     Ct Head Without Contrast    Result Date: 4/24/2020  EXAMINATION: CT HEAD WITHOUT CONTRAST CLINICAL HISTORY: Mental status changes; TECHNIQUE: CMS Mandated Quality Data-CT  Radiation Dose-436 All CT scans at this facility dose modulation, iterative reconstruction, and or weight-based dosing when appropriate to reduce radiation dose to as low as reasonably achievable. COMPARISON: None FINDINGS: Negative for acute intracranial hemorrhage, midline shift, or mass effect.  Cerebral atrophy with disproportion enlargement of the lateral and 3rd ventricles.  Periventricular white matter hypoattenuation, nonspecific, but most commonly associated with microangiopathic change..  Cerebellar hemispheres and brainstem are unremarkable.  Intracranial carotid artery atherosclerosis. No calvarial lesion or fracture.  Neck visualized paranasal sinuses and mastoid air cells are clear.     No CT evidence of acute intracranial pathology. Cerebral atrophy with slightly disproportionate ventricular enlargement.  Normal pressure hydrocephalus is a possibility. Electronically signed by: Reed Draper MD Date:    04/24/2020 Time:    13:09    X-ray Chest Ap Portable    Result Date: 4/24/2020  EXAMINATION: XR CHEST AP PORTABLE CLINICAL HISTORY: Altered mental status, unspecified FINDINGS: Portable chest with comparison chest x-ray 11/09/2018.  Normal cardiomediastinal silhouette.Bilateral hazy ill-defined opacities are demonstrated, significantly more pronounced in the right lung compared to the left.  Pulmonary vasculature is normal. No acute osseous abnormality.     Bilateral hazy ill-defined opacities demonstrated, significantly more pronounced in the right lung.  Findings may reflect infectious process or pulmonary edema. Electronically signed by: Parveen Gorman MD Date:    04/24/2020 Time:    11:35  ECG Results          EKG 12-lead (In process)  Result time 04/24/20 11:26:58    In process by Interface, Lab In [facility] (04/24/20 11:26:58)                 Narrative:    Test Reason : R06.00,    Vent. Rate : 090 BPM     Atrial Rate : 090 BPM     P-R Int : 134 ms          QRS Dur : 120 ms      QT Int : 376 ms        P-R-T Axes : 090 -66 063 degrees     QTc Int : 459 ms    Normal sinus rhythm  Pulmonary disease pattern  Right bundle branch block  Left anterior fascicular block   Bifascicular block   Septal infarct (cited on or before 24-APR-2020)  Abnormal ECG  When compared with ECG of 24-APR-2020 11:10,  No significant change was found    Referred By: AAAREFERR   SELF           Confirmed By:

## 2020-04-24 NOTE — TELEPHONE ENCOUNTER
----- Message from Umu Walker sent at 4/24/2020  9:42 AM CDT -----  Contact: Yue pt's daughter p  Daughter is now calling, she says that the nurse is trying to assess the pt at the nursing home and his Oxygen levels are at 80 . She says that the pt needs orders [facility] oxygen [facility]. So that the pt can get oxygen ASAP.   Yue's# [phone]

## 2020-04-24 NOTE — ED PROVIDER NOTES
Encounter Date: 4/24/2020       History     Chief Complaint   Patient presents with    Altered Mental Status     over the last 2 weeks with generalized weakness     84-year-old male with history of Alzheimer's, hypertension, hyperlipidemia, coronary artery disease.  Patient currently resides in [facility] at [facility].  Patient was transported to the emergency department today by his daughter-in-law Yue Bear.  Cell number [phone].  Per patient daughter in law, patient is a DNI/DNR.  Patient last seen well approximately 2 weeks ago.  Per Yue, patient has had decreased p.o. intake and changes in mental status over the last 2-3 days with increasing respiratory distress according to the memory care unit.  There was a concern that patient may have had corona virus.  She states that she was concerned and decided to take the patient to the emergency department herself for further evaluation and treatment.  Upon patient's arrival to the emergency department patient found to be hypoxic were room air sat of 80% and confuse to person place events, patient with groaning and nonverbal gurgly respiratory sounds.  Upon arrival into patient's room patient appear to be in marked hypoxic respiratory distress.  Patient placed on 100% non-rebreather oxygen and given supportive care immediately.        Review of patient's allergies indicates:   Allergen Reactions    Bactrim [sulfamethoxazole-trimethoprim]     Morphine      Other reaction(s): Hallucinations    Sulfa (sulfonamide antibiotics)      Other reaction(s): anxient  Other reaction(s): anxient    Trazodone hcl      Past Medical History:   Diagnosis Date    Allergy     Anticoagulant long-term use     Arthritis     Cancer     prostate    Cataract     Coronary artery disease     Hepatitis     Hyperlipidemia     Hypertension      Past Surgical History:   Procedure Laterality Date    BREAST SURGERY  2000    partial  masectomy left breast    COLONOSCOPY W/ POLYPECTOMY  oct 2011    corotid artery      EYE SURGERY      slaegle/ bilateral    PROSTATE SURGERY  2000    radical      Family History   Problem Relation Age of Onset    Alzheimer's disease Mother     Kidney disease Mother     Heart disease Father     Parkinsonism Brother      Social History     Tobacco Use    Smoking status: Former Smoker     Last attempt to quit: 1980     Years since quittin.5    Smokeless tobacco: Never Used    Tobacco comment: 20 PPP   Substance Use Topics    Alcohol use: No    Drug use: No     Review of Systems   Constitutional: Positive for chills, fatigue and fever.   HENT: Negative for sore throat.    Respiratory: Positive for cough and shortness of breath.    Cardiovascular: Negative for chest pain.   Gastrointestinal: Positive for diarrhea. Negative for abdominal pain and nausea.   Genitourinary: Negative for dysuria.   Musculoskeletal: Negative for back pain.   Skin: Negative for rash.   Neurological: Negative for weakness.   Hematological: Does not bruise/bleed easily.       Physical Exam     Initial Vitals [20 1109]   BP Pulse Resp Temp SpO2   108/71 94 20 -- (!) 79 %      MAP       --         Physical Exam    ED Course   Procedures  Labs Reviewed   CBC W/ AUTO DIFFERENTIAL - Abnormal; Notable for the following components:       Result Value    WBC 14.05 (*)     Mean Corpuscular Hemoglobin Conc 30.9 (*)     RDW 14.6 (*)     Gran # (ANC) 10.9 (*)     Immature Grans (Abs) 0.07 (*)     Gran% 77.7 (*)     Lymph% 14.2 (*)     All other components within normal limits   COMPREHENSIVE METABOLIC PANEL - Abnormal; Notable for the following components:    Sodium 154 (*)     Potassium 5.2 (*)     Chloride 118 (*)     CO2 22 (*)     Glucose 134 (*)     BUN, Bld 146 (*)     Creatinine 4.3 (*)     Albumin 3.4 (*)     eGFR if  13.7 (*)     eGFR if non  11.9 (*)     All other components within normal  limits   LACTIC ACID, PLASMA - Abnormal; Notable for the following components:    Lactate (Lactic Acid) 2.5 (*)     All other components within normal limits    Narrative:     Lactic Acid critical result(s) repeated. Called and verbal readback   obtained from Lauren Archibald RN/ED by JEET 04/24/2020 12:45   MAGNESIUM - Abnormal; Notable for the following components:    Magnesium 3.3 (*)     All other components within normal limits   PHOSPHORUS - Abnormal; Notable for the following components:    Phosphorus 5.7 (*)     All other components within normal limits   PROTIME-INR - Abnormal; Notable for the following components:    PT 17.1 (*)     All other components within normal limits   B-TYPE NATRIURETIC PEPTIDE - Abnormal; Notable for the following components:    BNP 1,203 (*)     All other components within normal limits   TROPONIN I - Abnormal; Notable for the following components:    Troponin I 0.062 (*)     All other components within normal limits    Narrative:     Troponin critical result(s) repeated. Called and verbal readback   obtained from Lauren Archibald RN/HUBERT by JEET 04/24/2020 12:45   CK - Abnormal; Notable for the following components:     (*)     All other components within normal limits   SEDIMENTATION RATE - Abnormal; Notable for the following components:    Sed Rate 15 (*)     All other components within normal limits   C-REACTIVE PROTEIN - Abnormal; Notable for the following components:    CRP 13.03 (*)     All other components within normal limits   FERRITIN - Abnormal; Notable for the following components:    Ferritin 1,785 (*)     All other components within normal limits   SARS-COV-2 RNA AMPLIFICATION, QUAL - Abnormal; Notable for the following components:    SARS-CoV-2 RNA, Amplification, Qual Positive (*)     All other components within normal limits    Narrative:     What symptom criteria does the patient meet?->Cough  What symptom criteria does the patient meet?->Difficulty  breathing    ISTAT PROCEDURE - Abnormal; Notable for the following components:    POC PO2 20 (*)     POC SATURATED O2 30 (*)     All other components within normal limits   CULTURE, BLOOD   CULTURE, BLOOD   INFLUENZA A AND B ANTIGEN    Narrative:     Specimen Source->Nasopharyngeal Swab   APTT   PROCALCITONIN   CK-MB   G6PD,QUANTITATIVE   POCT GLUCOSE        ECG Results          EKG 12-lead (In process)  Result time 04/24/20 11:26:58    In process by Interface, Lab In [facility] (04/24/20 11:26:58)                 Narrative:    Test Reason : R06.00,    Vent. Rate : 090 BPM     Atrial Rate : 090 BPM     P-R Int : 134 ms          QRS Dur : 120 ms      QT Int : 376 ms       P-R-T Axes : 090 -66 063 degrees     QTc Int : 459 ms    Normal sinus rhythm  Pulmonary disease pattern  Right bundle branch block  Left anterior fascicular block   Bifascicular block   Septal infarct (cited on or before 24-APR-2020)  Abnormal ECG  When compared with ECG of 24-APR-2020 11:10,  No significant change was found    Referred By: [email or web address]   SELF           Confirmed By:                             Imaging Results          CT Head Without Contrast (Final result)  Result time 04/24/20 13:09:08    Final result by Reed Draper MD (04/24/20 13:09:08)                 Impression:      No CT evidence of acute intracranial pathology.    Cerebral atrophy with slightly disproportionate ventricular enlargement.  Normal pressure hydrocephalus is a possibility.      Electronically signed by: Reed Draper MD  Date:    04/24/2020  Time:    13:09             Narrative:    EXAMINATION:  CT HEAD WITHOUT CONTRAST    CLINICAL HISTORY:  Mental status changes;    TECHNIQUE:  CMS Mandated Quality Data-CT Radiation Dose-436    All CT scans at this facility dose modulation, iterative reconstruction, and or weight-based dosing when appropriate to reduce radiation dose to as low as reasonably achievable.    COMPARISON:  None    FINDINGS:  Negative for acute intracranial  hemorrhage, midline shift, or mass effect.  Cerebral atrophy with disproportion enlargement of the lateral and 3rd ventricles.  Periventricular white matter hypoattenuation, nonspecific, but most commonly associated with microangiopathic change..  Cerebellar hemispheres and brainstem are unremarkable.  Intracranial carotid artery atherosclerosis.    No calvarial lesion or fracture.  Neck visualized paranasal sinuses and mastoid air cells are clear.                               X-Ray Chest AP Portable (Final result)  Result time 04/24/20 11:35:37    Final result by Parveen Gorman MD (04/24/20 11:35:37)                 Impression:      Bilateral hazy ill-defined opacities demonstrated, significantly more pronounced in the right lung.  Findings may reflect infectious process or pulmonary edema.      Electronically signed by: Parveen Gorman MD  Date:    04/24/2020  Time:    11:35             Narrative:    EXAMINATION:  XR CHEST AP PORTABLE    CLINICAL HISTORY:  Altered mental status, unspecified    FINDINGS:  Portable chest with comparison chest x-ray 11/09/2018.  Normal cardiomediastinal silhouette.Bilateral hazy ill-defined opacities are demonstrated, significantly more pronounced in the right lung compared to the left.  Pulmonary vasculature is normal. No acute osseous abnormality.                                 Medical Decision Making:   Initial Assessment:   84-year-old male with history of Alzheimer's presents emergency department with acute hypoxic respiratory distress and altered mental status  Differential Diagnosis:   Pneumonia, COVID pneumonitis, CVA, cardiogenic shock, distributive shock, bacteremia  Clinical Tests:   Lab Tests: Ordered and Reviewed  Radiological Study: Ordered and Reviewed  Medical Tests: Ordered and Reviewed  ED Management:  Patient's case discussed extensively with his family member who is his daughter in-law.  At this time they do not wish aggressive measures to be taken and per  family's wishes patient is only to receive palliative care at this time.  Patient is not to be intubated or resuscitated with pressors.  Family is aware that the patient is and distributive shock.  Case was discussed with Hospital Medicine.  Patient does have multi-system organ failure at this time and is found to have multilobar infiltrates consistent with Coronavirus.  Patient has tested positive for corona virus as well.  Patient's case discussed with Dr. Lancaster.  At this time patient will go on palliative treatment and will place consult to [facility].              Attending Attestation:         Attending Critical Care:   Critical Care Times:   Direct Patient Care (initial evaluation, reassessments, and time considering the case)................................................................30 minutes.   Additional History from reviewing old medical records or taking additional history from the family, EMS, PCP, etc.......................15 minutes.   Ordering, Reviewing, and Interpreting Diagnostic Studies...............................................................................................................10 minutes.   Documentation..................................................................................................................................................................................20 minutes.   Consultation with other Physicians. .................................................................................................................................................10 minutes.   Consultation with the patient's family directly relating to the patient's condition, care, and DNR status (when patient unable)......15 minutes.   ==============================================================  · Total Critical Care Time - exclusive of procedural time: 100 minutes.  ==============================================================  Critical care was necessary to treat or prevent  imminent or life-threatening deterioration of the following conditions: hypotension, metabolic crisis, dehydration, renal failure and respiratory failure.   Critical care was time spent personally by me on the following activities: obtaining history from patient or relative, examination of patient, review of x-rays / CT sent with the patient, review of old charts, ordering lab, x-rays, and/or EKG, development of treatment plan with patient or relative, ordering and performing treatments and interventions, evaluation of patient's response to treatment, discussions with primary provider, discussion with consultants, re-evaluation of patient's conition and end of life discussions.   Critical Care Condition: critical                             Clinical Impression:       ICD-10-CM ICD-9-CM   1. Pneumonia of both lungs due to infectious organism, unspecified part of lung J18.9 483.8   2. Altered mental status R41.82 780.97   3. Dyspnea R06.00 786.09   4. Pneumonia due to COVID-19 virus U07.1     J12.89    5. Acute kidney injury N17.9 584.9   6. Uremia N19 586             ED Disposition Condition    Admit                           Tray Greenberg MD  04/24/20 1410       Tray Greenberg MD  04/24/20 1422

## 2020-04-24 NOTE — ED NOTES
Condom catheter on pt  bipap taken off pt, 2 L nasal cannula applied  Pts daughter in law at bedside  Pt resting comfortably

## 2020-04-25 NOTE — NURSING
Rec'd call from patients DIL requesting that we do NOT disclose nor discuss patient status with Maria Fernanda Bear (related by marriage).     POA is Don Bear (patients son)  Password given:  Vahe    Will pass on in report and notified Dr Diaz via secure chat.

## 2020-04-26 NOTE — DISCHARGE SUMMARY
[facility] Medicine  Discharge Summary      Patient Name: Kaila Bear  MRN: [id number]  Admission Date: 4/24/2020  Hospital Length of Stay: 2 days  Discharge Date and Time:  04/26/2020 3:01 AM  Attending Physician: No att. providers found   Discharging Provider: Hugh Rios MD  Primary Care Provider: Hugh Lancaster MD      HPI:   Mr. Bear is an 84 years old male who was brought in to the ED via private vehicle (daughter in law Yue) from [facility] living [facility] due to altered mental status and weakness. Patient was tested positive for coronavirus, on BiPAP, has dementia and therefore collateral history obtained from daughter in law over the phone as well as ED provider. Daughter-in-law Yue Bear, cell number [phone]. Examination was performed from outside the room through glass door due to the pandemic. Patient lives in the memory unit at [facility] and was last seen well about 2 weeks ago, however due to covid pandemic visitation to the facility was restricted. Patient reportedly had decreased oral intake with change in mental status over the preceding 2-3 days.  He also developed increased respiratory distress and there was a concern that he may have corona virus. Family was very concerned and therefore decided to personally bring the patient to the ED for further evaluation.  Initially on arrival to the ED patient was noted to be in respiratory distress, confused, grunting, O2 saturations 80% on room air.  He was subsequently placed on non-rebreather and then BiPAP.  He is hypotensive with systolics in the 70s.  Labs with WBC 14, ferritin 1785, CRP 13, sodium 154 potassium 5.2, creatinine 4.3, BNP 1203, lactic acid 2.5, troponin 0.062, procalcitonin 0.28.  EKG sinus rhythm 90 beats per minute, .  Chest x-ray with bilateral ill-defined opacities right greater than left.  CT head atrophy diffusely with slight increase in ventricular size.  He was  ordered for Levaquin 750, Zosyn as well as vancomycin.  Case discussed with the ED provider, patient is a confirmed DNR/DNI.    Called and personally discussed with patient's daughter-in-law Yue over the phone, discussed clinical status and current clinical findings including respiratory failure, corona virus positive, renal failure, electrolyte abnormalities, hypotension, discussed recommendations to transition to comfort directed care which she is agreeable.  States her main goal is to keep patient comfortable knowing that he will  soon.  Noted morphine listed as allergy with reaction of hallucinations however she is unsure of same, discussed using dilaudid as an alternative.  She states patient primary care provider is Dr. Hugh Lancaster who is also a close friend, Dr. Greenberg did also call and discuss with PCP.    * No surgery found *      Hospital Course:   Patient was admitted for acute hypoxic respiratory failure from COVID-19 pneumonia, septic shock, along with hypoxic encephalopathy in the setting of dementia.  Patient was DNR/DNI.  Patient was placed on BiPAP in the ED.  Discussed with family and family wanted the patient to be in comfort care.     Called to see patient for unresponsiveness.  On exam the patient did not respond to verbal or physical stimuli.  Absent heart and breath sounds.  Absent peripheral pulses. Pupils are fixed and dilated. Patient pronounced dead at 2:55 AM on 2020..  Next of kin/family was notified.       Consults:   Consults (From admission, onward)        Status Ordering Provider     Inpatient consult to Hospitalist  Once     Provider:  Sary Weaver MD    Acknowledged SARY WEAVER     Inpatient consult to   Once     Provider:  (Not yet assigned)    Acknowledged LYNSEY YANG          No new Assessment & Plan notes have been filed under this hospital service since the last note was generated.  Service: Hospital Medicine    Final Active  Diagnoses:    Diagnosis Date Noted POA    PRINCIPAL PROBLEM:  Acute hypoxemic respiratory failure [J96.01] 2020 Yes    Comfort measures only status [Z51.5] 2020 Not Applicable    Pneumonia of both lungs due to infectious organism [J18.9] 2020 Yes    Septic shock [A41.9, R65.21] 2020 Yes    Acute renal failure [N17.9] 2020 Yes    Encephalopathy, multifactorial, hypoxia, infection, hypernatremia, renal failure [G93.41] 2020 Yes    Hypernatremia [E87.0] 2020 Yes    Hyperkalemia [E87.5] 2020 Yes    Hyperphosphatemia [E83.39] 2020 Yes    Dementia without behavioral disturbance [F03.90] 2020 Yes     Chronic      Problems Resolved During this Admission:       Discharged Condition:     Disposition:     Follow Up:    Patient Instructions:   No discharge procedures on file.    Significant Diagnostic Studies:     Pending Diagnostic Studies:     Procedure Component Value Units Date/Time    G6PD,Quantitative [[id number]] Collected:  20 1212    Order Status:  Sent Lab Status:  In process Updated:  20 1219    Specimen:  Blood          Medications:  None (patient  at medical facility)    Indwelling Lines/Drains at time of discharge:   Lines/Drains/Airways     None                 Time spent on the discharge of patient: 25 minutes  Patient was seen and examined on the date of discharge and determined to be suitable for discharge.         Hugh Rios MD  Department of Hospital Medicine  [facility]  Date of service: 2020

## 2020-04-26 NOTE — NURSING
Pt found unresponsive approx. 0230. Pulseless and apneic, non responsive. DNR status observed. Dr Rios notified, pronouncement at 02:55 am per Dr. Rios. 's office called and notified (Jeffrey) at 03:00, JULIA (Tiago kSinner) called and notified at 02:25. [facility] called and notified at 03:30. House supervisor Alejo notified. Body to [facility] at 04:32 without incident.

## 2020-04-26 NOTE — ASSESSMENT & PLAN NOTE
COVID 19 Test Positive  CXR showed Bilateral hazy ill-defined opacities demonstrated, significantly more pronounced in the right lung  Possibility of aspiration pneumonia also high on list

## 2020-04-26 NOTE — SUBJECTIVE & OBJECTIVE
Interval History:     Review of Systems   Unable to perform ROS: Patient nonverbal     Objective:     Vital Signs (Most Recent):  Temp: 97.7 °F (36.5 °C) (04/25/20 0705)  Pulse: 81 (04/25/20 0739)  Resp: 18 (04/25/20 0739)  BP: (!) 142/60 (04/25/20 0705)  SpO2: 95 % (04/25/20 0739) Vital Signs (24h Range):  Temp:  [97.7 °F (36.5 °C)] 97.7 °F (36.5 °C)  Pulse:  [] 81  Resp:  [18] 18  SpO2:  [95 %] 95 %  BP: (142)/(60) 142/60     Weight: 81.6 kg (180 lb)  Body mass index is 26.58 kg/m².    Intake/Output Summary (Last 24 hours) at 4/25/2020 1941  Last data filed at 4/25/2020 1309  Gross per 24 hour   Intake 0 ml   Output --   Net 0 ml      Physical Exam   Constitutional: He appears well-developed.   HENT:   Right Ear: External ear normal.   Left Ear: External ear normal.   Eyes: Conjunctivae are normal.   Neck: Normal range of motion and full passive range of motion without pain.   Cardiovascular: Normal rate.   Pulmonary/Chest: Breath sounds normal.   Abdominal: Soft.   Musculoskeletal: Normal range of motion.   Neurological:   Sleepy but arousable   Skin: Skin is warm.   Nursing note and vitals reviewed.      Significant Labs:   CBC:   Recent Labs   Lab 04/24/20  1115   WBC 14.05*   HGB 16.1   HCT 52.1        CMP:   Recent Labs   Lab 04/24/20  1115   *   K 5.2*   *   CO2 22*   *   *   CREATININE 4.3*   CALCIUM 9.3   PROT 7.7   ALBUMIN 3.4*   BILITOT 1.0   ALKPHOS 93   AST 36   ALT 18   ANIONGAP 14   EGFRNONAA 11.9*       Significant Imaging: I have reviewed all pertinent imaging results/findings within the past 24 hours.

## 2020-04-26 NOTE — HOSPITAL COURSE
Patient was admitted for acute hypoxic respiratory failure from COVID-19 pneumonia, septic shock, along with hypoxic encephalopathy in the setting of dementia.  Patient was DNR/DNI.  Patient was placed on BiPAP in the ED.  Discussed with family and family wanted the patient to be in comfort care.     Called to see patient for unresponsiveness.  On exam the patient did not respond to verbal or physical stimuli.  Absent heart and breath sounds.  Absent peripheral pulses. Pupils are fixed and dilated. Patient pronounced dead at 2:55 AM on 4/26/2020..  Next of kin/family was notified.

## 2020-04-26 NOTE — PROGRESS NOTES
[facility] Medicine  Progress Note    Patient Name: Kaila Bear  MRN: [id number]  Patient Class: IP- Inpatient   Admission Date: 4/24/2020  Length of Stay: 1 days  Attending Physician: Cornell Diaz MD  Primary Care Provider: Hugh Lancaster MD        Subjective:     Principal Problem:Acute hypoxemic respiratory failure        HPI:  Mr. Bear is an 84 years old male who was brought in to the ED via private vehicle (daughter in law Yue) from [facility] living [facility] due to altered mental status and weakness. Patient was tested positive for coronavirus, on BiPAP, has dementia and therefore collateral history obtained from daughter in law over the phone as well as ED provider. Daughter-in-law Yue Bear, cell number [phone]. Examination was performed from outside the room through glass door due to the pandemic. Patient lives in the memory unit at [facility] and was last seen well about 2 weeks ago, however due to covid pandemic visitation to the facility was restricted. Patient reportedly had decreased oral intake with change in mental status over the preceding 2-3 days.  He also developed increased respiratory distress and there was a concern that he may have corona virus. Family was very concerned and therefore decided to personally bring the patient to the ED for further evaluation.  Initially on arrival to the ED patient was noted to be in respiratory distress, confused, grunting, O2 saturations 80% on room air.  He was subsequently placed on non-rebreather and then BiPAP.  He is hypotensive with systolics in the 70s.  Labs with WBC 14, ferritin 1785, CRP 13, sodium 154 potassium 5.2, creatinine 4.3, BNP 1203, lactic acid 2.5, troponin 0.062, procalcitonin 0.28.  EKG sinus rhythm 90 beats per minute, .  Chest x-ray with bilateral ill-defined opacities right greater than left.  CT head atrophy diffusely with slight increase in ventricular size.  He was ordered for  Levaquin 750, Zosyn as well as vancomycin.  Case discussed with the ED provider, patient is a confirmed DNR/DNI.    Called and personally discussed with patient's daughter-in-law Yue over the phone, discussed clinical status and current clinical findings including respiratory failure, corona virus positive, renal failure, electrolyte abnormalities, hypotension, discussed recommendations to transition to comfort directed care which she is agreeable.  States her main goal is to keep patient comfortable knowing that he will  soon.  Noted morphine listed as allergy with reaction of hallucinations however she is unsure of same, discussed using dilaudid as an alternative.  She states patient primary care provider is Dr. Hugh Lancaster who is also a close friend, Dr. Greenberg did also call and discuss with PCP.    Overview/Hospital Course:  :No new issues.Pt on comfort care     Interval History:     Review of Systems   Unable to perform ROS: Patient nonverbal     Objective:     Vital Signs (Most Recent):  Temp: 97.7 °F (36.5 °C) (20 0705)  Pulse: 81 (20 0739)  Resp: 18 (20 0739)  BP: (!) 142/60 (20 0705)  SpO2: 95 % (20 0739) Vital Signs (24h Range):  Temp:  [97.7 °F (36.5 °C)] 97.7 °F (36.5 °C)  Pulse:  [] 81  Resp:  [18] 18  SpO2:  [95 %] 95 %  BP: (142)/(60) 142/60     Weight: 81.6 kg (180 lb)  Body mass index is 26.58 kg/m².    Intake/Output Summary (Last 24 hours) at 2020 194  Last data filed at 2020 1309  Gross per 24 hour   Intake 0 ml   Output --   Net 0 ml      Physical Exam   Constitutional: He appears well-developed.   HENT:   Right Ear: External ear normal.   Left Ear: External ear normal.   Eyes: Conjunctivae are normal.   Neck: Normal range of motion and full passive range of motion without pain.   Cardiovascular: Normal rate.   Pulmonary/Chest: Breath sounds normal.   Abdominal: Soft.   Musculoskeletal: Normal range of motion.   Neurological:   Sleepy  but arousable   Skin: Skin is warm.   Nursing note and vitals reviewed.      Significant Labs:   CBC:   Recent Labs   Lab 04/24/20  1115   WBC 14.05*   HGB 16.1   HCT 52.1        CMP:   Recent Labs   Lab 04/24/20  1115   *   K 5.2*   *   CO2 22*   *   *   CREATININE 4.3*   CALCIUM 9.3   PROT 7.7   ALBUMIN 3.4*   BILITOT 1.0   ALKPHOS 93   AST 36   ALT 18   ANIONGAP 14   EGFRNONAA 11.9*       Significant Imaging: I have reviewed all pertinent imaging results/findings within the past 24 hours.      Assessment/Plan:      * Acute hypoxemic respiratory failure  Comfort care only      Dementia without behavioral disturbance  Baseline condition  Now on Comfort care      Hyperphosphatemia  Comfort care      Hyperkalemia    Comfort care    Hypernatremia  Comfort care      Encephalopathy, multifactorial, hypoxia, infection, hypernatremia, renal failure  Comfort care only      Acute renal failure  Comfort care      Septic shock  Comfort care only      Pneumonia of both lungs due to infectious organism  COVID 19 Test Positive  CXR showed Bilateral hazy ill-defined opacities demonstrated, significantly more pronounced in the right lung  Possibility of aspiration pneumonia also high on list      Comfort measures only status  Consulted [facility]        VTE Risk Mitigation (From admission, onward)         Ordered     Reason for no Mechanical VTE Prophylaxis  Once     Question:  Reasons:  Answer:  Physician Provided (leave comment)  Comment:  comfort directed care    04/24/20 1349     IP VTE HIGH RISK PATIENT  Once      04/24/20 1349                      Cornell Diaz MD  Department of Hospital Medicine   [facility]

## 2020-04-27 LAB
G6PD BLD QN: 235 U/10E12 RBC (ref 146–376)
RBC # BLD AUTO: 5.69 X10E6/UL (ref 4.14–5.8)

## 2020-04-29 LAB
BACTERIA BLD CULT: NORMAL
BACTERIA BLD CULT: NORMAL

## 2020-04-30 ENCOUNTER — DOCUMENT SCAN (OUTPATIENT)
Dept: HOME HEALTH SERVICES | Facility: HOSPITAL | Age: 85
End: 2020-04-30

## 2020-05-12 ENCOUNTER — DOCUMENT SCAN (OUTPATIENT)
Dept: HOME HEALTH SERVICES | Facility: HOSPITAL | Age: 85
End: 2020-05-12

## 2020-06-05 ENCOUNTER — DOCUMENT SCAN (OUTPATIENT)
Dept: HOME HEALTH SERVICES | Facility: HOSPITAL | Age: 85
End: 2020-06-05